# Patient Record
Sex: MALE | Race: WHITE | NOT HISPANIC OR LATINO
[De-identification: names, ages, dates, MRNs, and addresses within clinical notes are randomized per-mention and may not be internally consistent; named-entity substitution may affect disease eponyms.]

---

## 2017-03-21 ENCOUNTER — APPOINTMENT (OUTPATIENT)
Dept: CARDIOLOGY | Facility: CLINIC | Age: 77
End: 2017-03-21

## 2017-04-03 ENCOUNTER — APPOINTMENT (OUTPATIENT)
Dept: CARDIOLOGY | Facility: CLINIC | Age: 77
End: 2017-04-03

## 2017-04-07 ENCOUNTER — APPOINTMENT (OUTPATIENT)
Dept: CARDIOLOGY | Facility: CLINIC | Age: 77
End: 2017-04-07

## 2017-06-20 ENCOUNTER — APPOINTMENT (OUTPATIENT)
Dept: CARDIOLOGY | Facility: CLINIC | Age: 77
End: 2017-06-20

## 2017-06-28 ENCOUNTER — APPOINTMENT (OUTPATIENT)
Dept: CARDIOLOGY | Facility: CLINIC | Age: 77
End: 2017-06-28

## 2017-07-10 ENCOUNTER — APPOINTMENT (OUTPATIENT)
Dept: CARDIOLOGY | Facility: CLINIC | Age: 77
End: 2017-07-10

## 2017-08-03 ENCOUNTER — APPOINTMENT (OUTPATIENT)
Dept: CARDIOLOGY | Facility: CLINIC | Age: 77
End: 2017-08-03

## 2017-08-08 ENCOUNTER — APPOINTMENT (OUTPATIENT)
Dept: CARDIOLOGY | Facility: CLINIC | Age: 77
End: 2017-08-08
Payer: MEDICARE

## 2017-08-08 PROCEDURE — 99214 OFFICE O/P EST MOD 30 MIN: CPT

## 2017-09-08 ENCOUNTER — APPOINTMENT (OUTPATIENT)
Dept: CARDIOLOGY | Facility: CLINIC | Age: 77
End: 2017-09-08
Payer: MEDICARE

## 2017-09-08 PROCEDURE — 93288 INTERROG EVL PM/LDLS PM IP: CPT

## 2017-11-02 ENCOUNTER — RECORD ABSTRACTING (OUTPATIENT)
Age: 77
End: 2017-11-02

## 2017-11-02 DIAGNOSIS — Z86.79 PERSONAL HISTORY OF OTHER DISEASES OF THE CIRCULATORY SYSTEM: ICD-10-CM

## 2017-11-02 DIAGNOSIS — Z86.2 PERSONAL HISTORY OF DISEASES OF THE BLOOD AND BLOOD-FORMING ORGANS AND CERTAIN DISORDERS INVOLVING THE IMMUNE MECHANISM: ICD-10-CM

## 2017-11-02 DIAGNOSIS — Z86.19 PERSONAL HISTORY OF OTHER INFECTIOUS AND PARASITIC DISEASES: ICD-10-CM

## 2017-11-02 DIAGNOSIS — Z87.898 PERSONAL HISTORY OF OTHER SPECIFIED CONDITIONS: ICD-10-CM

## 2017-11-02 DIAGNOSIS — Z98.890 OTHER SPECIFIED POSTPROCEDURAL STATES: ICD-10-CM

## 2017-12-08 ENCOUNTER — APPOINTMENT (OUTPATIENT)
Dept: CARDIOLOGY | Facility: CLINIC | Age: 77
End: 2017-12-08
Payer: MEDICARE

## 2017-12-08 PROCEDURE — 93280 PM DEVICE PROGR EVAL DUAL: CPT

## 2017-12-12 ENCOUNTER — RX RENEWAL (OUTPATIENT)
Age: 77
End: 2017-12-12

## 2018-02-13 ENCOUNTER — APPOINTMENT (OUTPATIENT)
Dept: CARDIOLOGY | Facility: CLINIC | Age: 78
End: 2018-02-13
Payer: MEDICARE

## 2018-02-13 VITALS
SYSTOLIC BLOOD PRESSURE: 132 MMHG | WEIGHT: 179 LBS | HEIGHT: 68 IN | BODY MASS INDEX: 27.13 KG/M2 | DIASTOLIC BLOOD PRESSURE: 72 MMHG | HEART RATE: 62 BPM

## 2018-02-13 PROCEDURE — 99214 OFFICE O/P EST MOD 30 MIN: CPT

## 2018-02-13 RX ORDER — METOPROLOL TARTRATE 25 MG/1
25 TABLET, FILM COATED ORAL
Qty: 90 | Refills: 1 | Status: DISCONTINUED | COMMUNITY
End: 2018-02-13

## 2018-03-08 ENCOUNTER — APPOINTMENT (OUTPATIENT)
Dept: CARDIOLOGY | Facility: CLINIC | Age: 78
End: 2018-03-08
Payer: MEDICARE

## 2018-03-08 PROCEDURE — 93306 TTE W/DOPPLER COMPLETE: CPT

## 2018-03-08 PROCEDURE — 93280 PM DEVICE PROGR EVAL DUAL: CPT

## 2018-07-10 ENCOUNTER — APPOINTMENT (OUTPATIENT)
Dept: CARDIOLOGY | Facility: CLINIC | Age: 78
End: 2018-07-10

## 2018-08-01 ENCOUNTER — APPOINTMENT (OUTPATIENT)
Dept: CARDIOLOGY | Facility: CLINIC | Age: 78
End: 2018-08-01
Payer: MEDICARE

## 2018-08-01 VITALS
SYSTOLIC BLOOD PRESSURE: 122 MMHG | WEIGHT: 176 LBS | BODY MASS INDEX: 26.67 KG/M2 | HEART RATE: 68 BPM | HEIGHT: 68 IN | DIASTOLIC BLOOD PRESSURE: 70 MMHG

## 2018-08-01 PROCEDURE — 99214 OFFICE O/P EST MOD 30 MIN: CPT | Mod: 25

## 2018-08-01 PROCEDURE — 93280 PM DEVICE PROGR EVAL DUAL: CPT

## 2018-08-01 PROCEDURE — 99214 OFFICE O/P EST MOD 30 MIN: CPT

## 2018-11-01 ENCOUNTER — APPOINTMENT (OUTPATIENT)
Dept: CARDIOLOGY | Facility: CLINIC | Age: 78
End: 2018-11-01
Payer: MEDICARE

## 2018-11-01 PROCEDURE — 93280 PM DEVICE PROGR EVAL DUAL: CPT

## 2018-12-18 ENCOUNTER — NON-APPOINTMENT (OUTPATIENT)
Age: 78
End: 2018-12-18

## 2018-12-18 ENCOUNTER — APPOINTMENT (OUTPATIENT)
Dept: CARDIOLOGY | Facility: CLINIC | Age: 78
End: 2018-12-18
Payer: MEDICARE

## 2018-12-18 VITALS
HEIGHT: 68 IN | SYSTOLIC BLOOD PRESSURE: 120 MMHG | DIASTOLIC BLOOD PRESSURE: 68 MMHG | RESPIRATION RATE: 14 BRPM | HEART RATE: 72 BPM | BODY MASS INDEX: 26.67 KG/M2 | WEIGHT: 176 LBS | OXYGEN SATURATION: 96 %

## 2018-12-18 PROCEDURE — 99214 OFFICE O/P EST MOD 30 MIN: CPT

## 2018-12-18 NOTE — HISTORY OF PRESENT ILLNESS
[FreeTextEntry1] : Bill is a 77-year-old male with history of aortic stenosis, ascending aortic aneurysm, status post single-vessel CABG, bioprosthetic AVR, and ascending aortic repair at [facility] in April 2013, postop heart block, [employer] permanent pacemaker implant in April 2013 at [place], hypertension and dyslipidemia, Babisiosis, thrombocytopenia, multiple platelet transfusion, hematology Dr Agarwal, syncopal fall head trauma SDH admit [facility] August 2014.\par \par Cardiovascular review of symptoms is negative for exertional chest pain, dyspnea, palpitations, dizziness or syncope. No PND or orthopnea leg edema. No bleeding or black stool.\par \par Patient is walking 20 minutes routine basis without exertional symptoms. He is compliant with his diet medications.\par \par Pacemaker check 6/28/17, SJM dual chamber, adequate battery, A- pacing 34% V-pacing less than 1%, 3 ventricular heart rate him in SVT, Toprol increased to 12.5 mg twice per day repeat check in 3 months\par  \par Lexascan Myoview stress July 2017, LVEF 54% diaphragm attenuation, no ischemia nonischemic EKG response, baseline sinus rhythm PRWP, NSST, no angina\par \par 2-D echo April 2017, LVEF 55%, function, normal bioprosthetic AVR, mild/moderate MR, normal aortic valve gradient, mild TR, PASP 35\par \par Carotid and abdominal ultrasound April 2017, nonobstructive, proximal aorta 2.8 cm.\par  \par Pacemaker check 3/21/17, SJM dual lead PPM, battery voltage 2.95, adequate thresholds, a paced 23% the patient less than 1%, brief SVT, 3 brief AF episodes less than 10 seconds\par \par Pacemaker check 9/29/16 adequate battery and threshold, several brief A. fib episode 8/7/16 2 minutes 32 seconds A. fib, 7/9/16 lasting 6 seconds.\par \par Stress echo performed February 2013 revealing a normal LVEF 60%, hypokinesis of the inferior anterior septal wall, mean aortic gradient of 108 with exercise at 93% of maximum predicted heart rate, 8 minutes 52 seconds Galo protocol, ischemic EKG response. \par \par Post op echo was performed in June 2013, LVEF 55%, moderate-to-severe left atrial enlargement, mild diastolic dysfunction, trace mild MR, mild TR, normal aortic valve gradient with a normally functioning aortic valve bioprosthesis.\par

## 2018-12-18 NOTE — PHYSICAL EXAM
[General Appearance - Well Developed] : well developed [Normal Appearance] : normal appearance [Well Groomed] : well groomed [General Appearance - Well Nourished] : well nourished [No Deformities] : no deformities [General Appearance - In No Acute Distress] : no acute distress [Normal Conjunctiva] : the conjunctiva exhibited no abnormalities [Eyelids - No Xanthelasma] : the eyelids demonstrated no xanthelasmas [Normal Oral Mucosa] : normal oral mucosa [No Oral Pallor] : no oral pallor [No Oral Cyanosis] : no oral cyanosis [Normal Jugular Venous A Waves Present] : normal jugular venous A waves present [Normal Jugular Venous V Waves Present] : normal jugular venous V waves present [No Jugular Venous Paiz A Waves] : no jugular venous paiz A waves [Respiration, Rhythm And Depth] : normal respiratory rhythm and effort [Exaggerated Use Of Accessory Muscles For Inspiration] : no accessory muscle use [Auscultation Breath Sounds / Voice Sounds] : lungs were clear to auscultation bilaterally [Heart Rate And Rhythm] : heart rate and rhythm were normal [Heart Sounds] : normal S1 and S2 [Abdomen Soft] : soft [Abdomen Tenderness] : non-tender [Abdomen Mass (___ Cm)] : no abdominal mass palpated [Abnormal Walk] : normal gait [Gait - Sufficient For Exercise Testing] : the gait was sufficient for exercise testing [Nail Clubbing] : no clubbing of the fingernails [Cyanosis, Localized] : no localized cyanosis [Petechial Hemorrhages (___cm)] : no petechial hemorrhages [Skin Color & Pigmentation] : normal skin color and pigmentation [] : no rash [No Venous Stasis] : no venous stasis [Skin Lesions] : no skin lesions [No Skin Ulcers] : no skin ulcer [No Xanthoma] : no  xanthoma was observed [Oriented To Time, Place, And Person] : oriented to person, place, and time [Affect] : the affect was normal [Mood] : the mood was normal [No Anxiety] : not feeling anxious [FreeTextEntry1] : 2/6 KRYSTINA, nl AVR click, right upper chest PPM

## 2018-12-18 NOTE — REASON FOR VISIT
[Follow-Up - Clinic] : a clinic follow-up of [Aortic Stenosis] : aortic stenosis [Follow-up Device Check] : follow-up device check visit [FreeTextEntry2] : bioAVR asc aorta repair 2013, CHB, PPM 2013, syncope fall SDH CVA 2014 [FreeTextEntry1] : Bill is a 78-year-old male with history of aortic stenosis, ascending aortic aneurysm, status post single-vessel CABG, bioprosthetic AVR, and ascending aortic repair at [facility] in April 2013, postop heart block, [employer] Kirit permanent pacemaker implant in April 2013 at [place], hypertension and dyslipidemia, Babisiosis, thrombocytopenia, multiple platelet transfusion, hematology Dr Agarwal, syncopal fall head trauma SDH admit [facility] August 2014, Prostate CA radiation therapy\par \par Cardiovascular review of symptoms is negative for exertional chest pain, dyspnea, palpitations, dizziness or syncope. No PND or orthopnea leg edema. No bleeding or black stool.\par \par Patient is walking 20 minutes routine basis without exertional symptoms. He is compliant with his diet medications.\par \par Patient is walking 15 minutes without exertional chest pain.\par \par Pacemaker check 6/28/17, SJM dual chamber, adequate battery, A- pacing 34% V-pacing less than 1%, 3 ventricular heart rate him in SVT, Toprol increased to 12.5 mg twice per day repeat check in 3 months\par  \par Lexascan Myoview stress July 2017, LVEF 54% diaphragm attenuation, no ischemia nonischemic EKG response, baseline sinus rhythm PRWP, NSST, no angina\par \par 2-D echo April 2017, LVEF 55%, function, normal bioprosthetic AVR, mild/moderate MR, normal aortic valve gradient, mild TR, PASP 35\par \par Carotid and abdominal ultrasound April 2017, nonobstructive, proximal aorta 2.8 cm.\par  \par Pacemaker check 3/21/17, SJM dual lead PPM, battery voltage 2.95, adequate thresholds, a paced 23% the patient less than 1%, brief SVT, 3 brief AF episodes less than 10 seconds\par \par Pacemaker check 9/29/16 adequate battery and threshold, several brief A. fib episode 8/7/16 2 minutes 32 seconds A. fib, 7/9/16 lasting 6 seconds.\par \par Stress echo performed February 2013 revealing a normal LVEF 60%, hypokinesis of the inferior anterior septal wall, mean aortic gradient of 108 with exercise at 93% of maximum predicted heart rate, 8 minutes 52 seconds Galo protocol, ischemic EKG response. \par \par Post op echo was performed in June 2013, LVEF 55%, moderate-to-severe left atrial enlargement, mild diastolic dysfunction, trace mild MR, mild TR, normal aortic valve gradient with a normally functioning aortic valve bioprosthesis.\par

## 2018-12-18 NOTE — DISCUSSION/SUMMARY
[FreeTextEntry1] : Bill is a 77-year-old male with medical history detailed above and active medical issues including: \par \par -No angina symptoms and normal perfusion MPI stress test, normal LVEF July 2017\par \par -History of severe aortic stenosis, abnormal exercise stress echo, status post single-vessel CABG, bioprosthetic AVR, aortic root repair in April 2013.\par \par - Brief PAF CHADS VASc score 3, history of subdural hematoma, moderate risk for oral anticoagulation,  electrophysiologist Dr. Saad Reyes, no AC unless prolonged PAF.\par \par -Postop heart block, St. Kirit dual chamber permanent pacemaker implant in April 2013.\par \par -Hypertension at goal <130/80, follow up home BP daily over one week.\par \par -History of Babesiosis/thrombocytopenia following with hematology \par \par -Dyslipidemia intolerant to statins .\par \par -Prior tobacco abuse.\par \par Advised patient to follow active lifestyle with regular cardiovascular exercise. Patient educated on lifestyle and diet modification with low sodium low fat diet and avoidance of excessive alcohol. Patient is aware to call with any symptoms or concerns. \par \par Patient was seen in cardiology followup in 6 months. Continued office PPM check every 6 months with home TTM every 3 months. Patient will have 2-D echocardiogram to assess for  LV systolic function, wall motion and  structural heart disease. Carotid and abdominal ultrasound to assess for obstructive PAD. \par \par Bill will follow up with Dr. Bennett for ongoing medical management and repeat labs.\par

## 2018-12-18 NOTE — PROCEDURE
[No] : not [NSR] : normal sinus rhythm [Pacemaker] : pacemaker [DDD] : DDD [Voltage: ___ volts] : Voltage was [unfilled] volts [Lead Imp:  ___ohms] : lead impedance was [unfilled] ohms [Sensing Amplitude ___mv] : sensing amplitude was [unfilled] mv [___V @] : [unfilled] V [___ ms] : [unfilled] ms [None] : none [Threshold Testing Performed] : Threshold testing was not performed [de-identified] : RAZA  [de-identified] : [employer]  [de-identified] : [id number] [de-identified] : [birth year] [de-identified] : 62 [de-identified] : 8.2-9.3 years.  [de-identified] : AP 30%\par  <1 %\par 1 Episodes AF < 10s and one episode of Aflutter vs SVT <10 s\par \par He has history of this, however given prior history of syncopal fall and SDH, EP advised no AC unless prolonged AF. \par \par Continue 3 months follow up. \par

## 2019-03-07 ENCOUNTER — APPOINTMENT (OUTPATIENT)
Dept: CARDIOLOGY | Facility: CLINIC | Age: 79
End: 2019-03-07
Payer: MEDICARE

## 2019-03-07 PROCEDURE — 93280 PM DEVICE PROGR EVAL DUAL: CPT

## 2019-03-08 NOTE — PROCEDURE
[No] : not [NSR] : normal sinus rhythm [Pacemaker] : pacemaker [DDD] : DDD [Voltage: ___ volts] : Voltage was [unfilled] volts [Lead Imp:  ___ohms] : lead impedance was [unfilled] ohms [Sensing Amplitude ___mv] : sensing amplitude was [unfilled] mv [___V @] : [unfilled] V [___ ms] : [unfilled] ms [None] : none [Threshold Testing Performed] : Threshold testing was not performed [de-identified] : RAZA  [de-identified] : [employer]  [de-identified] : [id number] [de-identified] : [birth year] [de-identified] : 62 [de-identified] : 8.2-9.3 years.  [de-identified] : AP 29%\par  <1 %\par 1 Episodes AT < 10s  no definitive AF. \par \par He has history of this, however given prior history of syncopal fall and SDH, EP advised no AC unless prolonged AF. \par \par Continue 3 months follow up. \par

## 2019-03-08 NOTE — REASON FOR VISIT
[Follow-up Device Check] : follow-up device check visit [___ Device Check] : [unfilled] device check [Follow-Up - Clinic] : a clinic follow-up of [Aortic Stenosis] : aortic stenosis [FreeTextEntry2] : bioAVR asc aorta repair 2013, CHB, PPM 2013, syncope fall SDH CVA 2014

## 2019-06-03 ENCOUNTER — CHART COPY (OUTPATIENT)
Age: 79
End: 2019-06-03

## 2019-06-04 ENCOUNTER — APPOINTMENT (OUTPATIENT)
Dept: CARDIOLOGY | Facility: CLINIC | Age: 79
End: 2019-06-04
Payer: MEDICARE

## 2019-06-04 PROCEDURE — 93306 TTE W/DOPPLER COMPLETE: CPT

## 2019-06-11 ENCOUNTER — APPOINTMENT (OUTPATIENT)
Dept: CARDIOLOGY | Facility: CLINIC | Age: 79
End: 2019-06-11
Payer: MEDICARE

## 2019-06-11 VITALS
SYSTOLIC BLOOD PRESSURE: 112 MMHG | WEIGHT: 174 LBS | OXYGEN SATURATION: 98 % | BODY MASS INDEX: 26.37 KG/M2 | HEIGHT: 68 IN | HEART RATE: 68 BPM | DIASTOLIC BLOOD PRESSURE: 76 MMHG

## 2019-06-11 PROCEDURE — 99215 OFFICE O/P EST HI 40 MIN: CPT

## 2019-06-11 RX ORDER — METOPROLOL TARTRATE 25 MG/1
25 TABLET, FILM COATED ORAL
Qty: 90 | Refills: 1 | Status: DISCONTINUED | COMMUNITY
Start: 2018-08-01 | End: 2019-06-11

## 2019-06-11 NOTE — REASON FOR VISIT
[Follow-Up - Clinic] : a clinic follow-up of [Aortic Stenosis] : aortic stenosis [Follow-up Device Check] : follow-up device check visit [FreeTextEntry2] : bioAVR asc aorta repair 2013, CHB, PPM 2013, syncope fall SDH CVA 2014 [FreeTextEntry1] : I saw this  78-year-old male in f/u consultation on 06/11/19.\par  history of aortic stenosis, ascending aortic aneurysm, status post single-vessel CABG, bioprosthetic AVR, and ascending aortic repair at [facility] in April 2013, postop heart block, [employer] Kirit permanent pacemaker implant in April 2013 at [place], hypertension and dyslipidemia, Babisiosis, thrombocytopenia, multiple platelet transfusion, hematology Dr Agarwal, syncopal fall head trauma SDH admit [facility] August 2014, Prostate CA radiation therapy\par \par Cardiovascular review of symptoms is negative for exertional chest pain, dyspnea, palpitations, dizziness or syncope. No PND or orthopnea leg edema. No bleeding or black stool.\par \par Patient is walking 20 minutes routine basis without exertional symptoms. He is compliant with his diet medications.\par \par Patient is walking 15 minutes without exertional chest pain.\par \par Pacemaker check 6/28/17, SJM dual chamber, adequate battery, A- pacing 34% V-pacing less than 1%, 3 ventricular heart rate him in SVT, Toprol increased to 12.5 mg twice per day repeat check in 3 months\par  \par Lexascan Myoview stress July 2017, LVEF 54% diaphragm attenuation, no ischemia nonischemic EKG response, baseline sinus rhythm PRWP, NSST, no angina\par \par 2-D echo April 2017, LVEF 55%, function, normal bioprosthetic AVR, mild/moderate MR, normal aortic valve gradient, mild TR, PASP 35\par \par Carotid and abdominal ultrasound April 2017, nonobstructive, proximal aorta 2.8 cm.\par  \par Pacemaker check 3/21/17, SJM dual lead PPM, battery voltage 2.95, adequate thresholds, a paced 23% the patient less than 1%, brief SVT, 3 brief AF episodes less than 10 seconds\par \par Pacemaker check 9/29/16 adequate battery and threshold, several brief A. fib episode 8/7/16 2 minutes 32 seconds A. fib, 7/9/16 lasting 6 seconds.\par \par Stress echo performed February 2013 revealing a normal LVEF 60%, hypokinesis of the inferior anterior septal wall, mean aortic gradient of 108 with exercise at 93% of maximum predicted heart rate, 8 minutes 52 seconds Galo protocol, ischemic EKG response. \par \par Post op echo was performed in June 2013, LVEF 55%, moderate-to-severe left atrial enlargement, mild diastolic dysfunction, trace mild MR, mild TR, normal aortic valve gradient with a normally functioning aortic valve bioprosthesis.\par

## 2019-06-11 NOTE — HISTORY OF PRESENT ILLNESS
[FreeTextEntry1] : he has no chest pain\par He has no shortness of breath\par He has no palpitations\par He has no syncope\par He is neurologically intact\par He has no edema\par He has no GI symptoms\par \par

## 2019-06-11 NOTE — PROCEDURE
[No] : not [DDD] : DDD [NSR] : normal sinus rhythm [Pacemaker] : pacemaker [Voltage: ___ volts] : Voltage was [unfilled] volts [Lead Imp:  ___ohms] : lead impedance was [unfilled] ohms [___V @] : [unfilled] V [Sensing Amplitude ___mv] : sensing amplitude was [unfilled] mv [None] : none [___ ms] : [unfilled] ms [de-identified] : RAZA  [Threshold Testing Performed] : Threshold testing was not performed [de-identified] : [id number] [de-identified] : [employer]  [de-identified] : [birth year] [de-identified] : 62 [de-identified] : 8.2-9.3 years.  [de-identified] : AP 30%\par  <1 %\par 1 Episodes AF < 10s and one episode of Aflutter vs SVT <10 s\par \par He has history of this, however given prior history of syncopal fall and SDH, EP advised no AC unless prolonged AF. \par \par Continue 3 months follow up. \par

## 2019-06-11 NOTE — PHYSICAL EXAM
[General Appearance - Well Developed] : well developed [Normal Appearance] : normal appearance [General Appearance - Well Nourished] : well nourished [Well Groomed] : well groomed [No Deformities] : no deformities [General Appearance - In No Acute Distress] : no acute distress [Normal Conjunctiva] : the conjunctiva exhibited no abnormalities [Normal Oral Mucosa] : normal oral mucosa [Eyelids - No Xanthelasma] : the eyelids demonstrated no xanthelasmas [No Oral Pallor] : no oral pallor [No Oral Cyanosis] : no oral cyanosis [Normal Jugular Venous A Waves Present] : normal jugular venous A waves present [Normal Jugular Venous V Waves Present] : normal jugular venous V waves present [No Jugular Venous Paiz A Waves] : no jugular venous paiz A waves [Respiration, Rhythm And Depth] : normal respiratory rhythm and effort [Exaggerated Use Of Accessory Muscles For Inspiration] : no accessory muscle use [Heart Rate And Rhythm] : heart rate and rhythm were normal [Heart Sounds] : normal S1 and S2 [Auscultation Breath Sounds / Voice Sounds] : lungs were clear to auscultation bilaterally [Abdomen Soft] : soft [Abdomen Mass (___ Cm)] : no abdominal mass palpated [Abdomen Tenderness] : non-tender [Nail Clubbing] : no clubbing of the fingernails [Gait - Sufficient For Exercise Testing] : the gait was sufficient for exercise testing [Abnormal Walk] : normal gait [Cyanosis, Localized] : no localized cyanosis [Petechial Hemorrhages (___cm)] : no petechial hemorrhages [Skin Color & Pigmentation] : normal skin color and pigmentation [] : no rash [No Venous Stasis] : no venous stasis [Skin Lesions] : no skin lesions [No Skin Ulcers] : no skin ulcer [No Xanthoma] : no  xanthoma was observed [Oriented To Time, Place, And Person] : oriented to person, place, and time [Affect] : the affect was normal [Mood] : the mood was normal [No Anxiety] : not feeling anxious [FreeTextEntry1] : 2/6 KRYSTINA, nl AVR click, right upper chest PPM

## 2019-06-18 ENCOUNTER — APPOINTMENT (OUTPATIENT)
Dept: CARDIOLOGY | Facility: CLINIC | Age: 79
End: 2019-06-18
Payer: MEDICARE

## 2019-06-18 PROCEDURE — 93280 PM DEVICE PROGR EVAL DUAL: CPT

## 2019-06-18 NOTE — PROCEDURE
[No] : not [NSR] : normal sinus rhythm [Pacemaker] : pacemaker [DDD] : DDD [Voltage: ___ volts] : Voltage was [unfilled] volts [___V @] : [unfilled] V [___ ms] : [unfilled] ms [None] : none [Threshold Testing Performed] : Threshold testing was not performed [Sensing Amplitude ___mv] : sensing amplitude was [unfilled] mv [Lead Imp:  ___ohms] : lead impedance was [unfilled] ohms [de-identified] : RAZA  [de-identified] : [employer]  [de-identified] : [id number] [de-identified] : [birth year] [de-identified] : 7.5-9.1 years.  [de-identified] : 62 [de-identified] : AP 29%\par  <1 %\par 1 Episodes SVT < 10s  no  AF. On beta blockers, Asx, in April 2019, none since. Normal EF by echo 6-19.\par \par \par \par Continue 3 months follow up. \par

## 2019-06-25 ENCOUNTER — APPOINTMENT (OUTPATIENT)
Dept: CARDIOLOGY | Facility: CLINIC | Age: 79
End: 2019-06-25
Payer: MEDICARE

## 2019-07-01 ENCOUNTER — APPOINTMENT (OUTPATIENT)
Dept: CARDIOLOGY | Facility: CLINIC | Age: 79
End: 2019-07-01
Payer: MEDICARE

## 2019-07-01 PROCEDURE — 93880 EXTRACRANIAL BILAT STUDY: CPT

## 2019-07-01 PROCEDURE — 93979 VASCULAR STUDY: CPT

## 2019-07-01 PROCEDURE — 93306 TTE W/DOPPLER COMPLETE: CPT

## 2019-08-05 ENCOUNTER — MEDICATION RENEWAL (OUTPATIENT)
Age: 79
End: 2019-08-05

## 2019-09-18 ENCOUNTER — APPOINTMENT (OUTPATIENT)
Dept: CARDIOLOGY | Facility: CLINIC | Age: 79
End: 2019-09-18
Payer: MEDICARE

## 2019-09-18 PROCEDURE — 93280 PM DEVICE PROGR EVAL DUAL: CPT

## 2019-09-18 NOTE — REASON FOR VISIT
[___ Device Check] : [unfilled] device check [Follow-up Device Check] : follow-up device check visit [Follow-Up - Clinic] : a clinic follow-up of [Aortic Stenosis] : aortic stenosis [FreeTextEntry2] : bioAVR asc aorta repair 2013, CHB, PPM 2013, syncope fall SDH CVA 2014

## 2019-09-18 NOTE — PROCEDURE
[No] : not [Pacemaker] : pacemaker [NSR] : normal sinus rhythm [DDD] : DDD [Voltage: ___ volts] : Voltage was [unfilled] volts [Sensing Amplitude ___mv] : sensing amplitude was [unfilled] mv [Lead Imp:  ___ohms] : lead impedance was [unfilled] ohms [___V @] : [unfilled] V [None] : none [___ ms] : [unfilled] ms [Threshold Testing Performed] : Threshold testing was not performed [de-identified] : RAZA  [de-identified] : [employer]  [de-identified] : [id number] [de-identified] : 62 [de-identified] : [birth year] [de-identified] : 7.0-8.2 years.  [de-identified] : AP 30%\par  <1 %\par \par 0 Episodes.\par \par Continue 3 months follow up. \par

## 2019-12-10 ENCOUNTER — NON-APPOINTMENT (OUTPATIENT)
Age: 79
End: 2019-12-10

## 2019-12-10 ENCOUNTER — APPOINTMENT (OUTPATIENT)
Dept: CARDIOLOGY | Facility: CLINIC | Age: 79
End: 2019-12-10
Payer: MEDICARE

## 2019-12-10 VITALS
SYSTOLIC BLOOD PRESSURE: 112 MMHG | HEIGHT: 68 IN | OXYGEN SATURATION: 97 % | WEIGHT: 170 LBS | DIASTOLIC BLOOD PRESSURE: 70 MMHG | BODY MASS INDEX: 25.76 KG/M2 | HEART RATE: 72 BPM

## 2019-12-10 PROCEDURE — 99215 OFFICE O/P EST HI 40 MIN: CPT

## 2019-12-10 PROCEDURE — 93000 ELECTROCARDIOGRAM COMPLETE: CPT

## 2019-12-10 NOTE — PHYSICAL EXAM
[Normal Appearance] : normal appearance [General Appearance - Well Developed] : well developed [Well Groomed] : well groomed [General Appearance - Well Nourished] : well nourished [No Deformities] : no deformities [General Appearance - In No Acute Distress] : no acute distress [Normal Conjunctiva] : the conjunctiva exhibited no abnormalities [Eyelids - No Xanthelasma] : the eyelids demonstrated no xanthelasmas [No Oral Pallor] : no oral pallor [No Oral Cyanosis] : no oral cyanosis [Normal Oral Mucosa] : normal oral mucosa [Normal Jugular Venous A Waves Present] : normal jugular venous A waves present [Normal Jugular Venous V Waves Present] : normal jugular venous V waves present [No Jugular Venous Paiz A Waves] : no jugular venous paiz A waves [Respiration, Rhythm And Depth] : normal respiratory rhythm and effort [Exaggerated Use Of Accessory Muscles For Inspiration] : no accessory muscle use [Auscultation Breath Sounds / Voice Sounds] : lungs were clear to auscultation bilaterally [Heart Rate And Rhythm] : heart rate and rhythm were normal [Heart Sounds] : normal S1 and S2 [Systolic grade ___/6] : A grade [unfilled]/6 systolic murmur was heard. [Abdomen Soft] : soft [Abdomen Tenderness] : non-tender [Abdomen Mass (___ Cm)] : no abdominal mass palpated [Abnormal Walk] : normal gait [Gait - Sufficient For Exercise Testing] : the gait was sufficient for exercise testing [Nail Clubbing] : no clubbing of the fingernails [Cyanosis, Localized] : no localized cyanosis [Petechial Hemorrhages (___cm)] : no petechial hemorrhages [Skin Color & Pigmentation] : normal skin color and pigmentation [] : no rash [No Venous Stasis] : no venous stasis [Skin Lesions] : no skin lesions [No Skin Ulcers] : no skin ulcer [No Xanthoma] : no  xanthoma was observed [Oriented To Time, Place, And Person] : oriented to person, place, and time [Affect] : the affect was normal [Mood] : the mood was normal [No Anxiety] : not feeling anxious [FreeTextEntry1] : 2/6 KRYSTINA, nl AVR click, right upper chest PPM

## 2019-12-10 NOTE — PROCEDURE
[No] : not [Pacemaker] : pacemaker [NSR] : normal sinus rhythm [Voltage: ___ volts] : Voltage was [unfilled] volts [DDD] : DDD [Lead Imp:  ___ohms] : lead impedance was [unfilled] ohms [Sensing Amplitude ___mv] : sensing amplitude was [unfilled] mv [___V @] : [unfilled] V [___ ms] : [unfilled] ms [None] : none [Threshold Testing Performed] : Threshold testing was not performed [de-identified] : RAZA  [de-identified] : [employer]  [de-identified] : [birth year] [de-identified] : [id number] [de-identified] : 62 [de-identified] : 8.2-9.3 years.  [de-identified] : AP 30%\par  <1 %\par 1 Episodes AF < 10s and one episode of Aflutter vs SVT <10 s\par \par He has history of this, however given prior history of syncopal fall and SDH, EP advised no AC unless prolonged AF. \par \par Continue 3 months follow up. \par

## 2019-12-10 NOTE — DISCUSSION/SUMMARY
[FreeTextEntry1] : Bill is a 79-year-old male with medical history detailed above and active medical issues including: \par \par -No angina symptoms and normal perfusion MPI stress test, normal LVEF July 2017\par \par -History of severe aortic stenosis, abnormal exercise stress echo, status post single-vessel CABG, bioprosthetic AVR, aortic root repair in April 2013.\par \par - Brief PAF CHADS VASc score 3, history of subdural hematoma, moderate risk for oral anticoagulation,  electrophysiologist Dr. Saad Reyes, no AC unless prolonged PAF.\par \par -Postop heart block, [employer] dual chamber permanent pacemaker implant in April 2013.\par \par -Hypertension at goal <130/80, follow up home BP daily over one week.\par \par -History of Babesiosis/thrombocytopenia following with hematology \par \par -Dyslipidemia intolerant to statins .\par \par -Prior tobacco abuse.\par \par Advised patient to follow active lifestyle with regular cardiovascular exercise. Patient educated on lifestyle and diet modification with low sodium low fat diet and avoidance of excessive alcohol. Patient is aware to call with any symptoms or concerns. \par \par Patient was seen in cardiology followup in 6 months. Continued office PPM check every 6 months with home TTM every 3 months. Patient will have 2-D echocardiogram to assess for  LV systolic function, wall motion and  structural heart disease. Carotid and abdominal ultrasound to assess for obstructive PAD. \par \par Bill will follow up with Dr. Bennett for ongoing medical management and repeat labs.\par

## 2019-12-10 NOTE — REASON FOR VISIT
[Follow-Up - Clinic] : a clinic follow-up of [Aortic Stenosis] : aortic stenosis [Follow-up Device Check] : follow-up device check visit [FreeTextEntry2] : bioAVR asc aorta repair 2013, CHB, PPM 2013, syncope fall SDH CVA 2014 [FreeTextEntry1] : I saw this  79-year-old male in f/u consultation on 12/10/19.\par  history of aortic stenosis, ascending aortic aneurysm, status post single-vessel CABG, bioprosthetic AVR, and ascending aortic repair at [facility] in April 2013, postop heart block, [employer] permanent pacemaker implant in April 2013 at [place], hypertension and dyslipidemia, Babisiosis, thrombocytopenia, multiple platelet transfusion, hematology Dr Agarwal, syncopal fall head trauma SDH admit [facility] August 2014, Prostate CA radiation therapy\par \par Cardiovascular review of symptoms is negative for exertional chest pain, dyspnea, palpitations, dizziness or syncope. No PND or orthopnea leg edema. No bleeding or black stool.\par \par Patient is walking 20 minutes routine basis without exertional symptoms. He is compliant with his diet medications.\par \par Patient is walking 15 minutes without exertional chest pain.\par \par Pacemaker check 6/28/17, SJM dual chamber, adequate battery, A- pacing 34% V-pacing less than 1%, 3 ventricular heart rate him in SVT, Toprol increased to 12.5 mg twice per day repeat check in 3 months\par  \par Lexascan Myoview stress July 2017, LVEF 54% diaphragm attenuation, no ischemia nonischemic EKG response, baseline sinus rhythm PRWP, NSST, no angina\par \par 2-D echo April 2017, LVEF 55%, function, normal bioprosthetic AVR, mild/moderate MR, normal aortic valve gradient, mild TR, PASP 35\par \par Carotid and abdominal ultrasound April 2017, nonobstructive, proximal aorta 2.8 cm.\par  \par Pacemaker check 3/21/17, SJM dual lead PPM, battery voltage 2.95, adequate thresholds, a paced 23% the patient less than 1%, brief SVT, 3 brief AF episodes less than 10 seconds\par \par Pacemaker check 9/29/16 adequate battery and threshold, several brief A. fib episode 8/7/16 2 minutes 32 seconds A. fib, 7/9/16 lasting 6 seconds.\par \par Stress echo performed February 2013 revealing a normal LVEF 60%, hypokinesis of the inferior anterior septal wall, mean aortic gradient of 108 with exercise at 93% of maximum predicted heart rate, 8 minutes 52 seconds Galo protocol, ischemic EKG response. \par \par Post op echo was performed in June 2013, LVEF 55%, moderate-to-severe left atrial enlargement, mild diastolic dysfunction, trace mild MR, mild TR, normal aortic valve gradient with a normally functioning aortic valve bioprosthesis.\par

## 2019-12-17 ENCOUNTER — APPOINTMENT (OUTPATIENT)
Dept: CARDIOLOGY | Facility: CLINIC | Age: 79
End: 2019-12-17
Payer: MEDICARE

## 2019-12-17 PROCEDURE — 93280 PM DEVICE PROGR EVAL DUAL: CPT

## 2019-12-17 NOTE — PROCEDURE
[No] : not [Pacemaker] : pacemaker [NSR] : normal sinus rhythm [DDD] : DDD [Voltage: ___ volts] : Voltage was [unfilled] volts [Lead Imp:  ___ohms] : lead impedance was [unfilled] ohms [Sensing Amplitude ___mv] : sensing amplitude was [unfilled] mv [___V @] : [unfilled] V [___ ms] : [unfilled] ms [None] : none [de-identified] : [employer]  [Threshold Testing Performed] : Threshold testing was not performed [de-identified] : RAZA  [de-identified] : 4-24-[birth year] [de-identified] : 62 [de-identified] : [id number] [de-identified] : 7.0-8.2 years.  [de-identified] : AP 27%\par  <1 %\par \par 0 Episodes.\par \par Continue 3 months follow up.

## 2020-03-04 ENCOUNTER — APPOINTMENT (OUTPATIENT)
Dept: CARDIOLOGY | Facility: CLINIC | Age: 80
End: 2020-03-04

## 2020-03-10 ENCOUNTER — APPOINTMENT (OUTPATIENT)
Dept: CARDIOLOGY | Facility: CLINIC | Age: 80
End: 2020-03-10
Payer: MEDICARE

## 2020-03-10 VITALS
OXYGEN SATURATION: 97 % | DIASTOLIC BLOOD PRESSURE: 80 MMHG | WEIGHT: 177 LBS | HEART RATE: 60 BPM | HEIGHT: 68 IN | BODY MASS INDEX: 26.83 KG/M2 | SYSTOLIC BLOOD PRESSURE: 140 MMHG

## 2020-03-10 PROCEDURE — 93280 PM DEVICE PROGR EVAL DUAL: CPT

## 2020-03-10 PROCEDURE — 99215 OFFICE O/P EST HI 40 MIN: CPT

## 2020-03-10 NOTE — PROCEDURE
[No] : not [NSR] : normal sinus rhythm [Pacemaker] : pacemaker [DDD] : DDD [Voltage: ___ volts] : Voltage was [unfilled] volts [Lead Imp:  ___ohms] : lead impedance was [unfilled] ohms [Sensing Amplitude ___mv] : sensing amplitude was [unfilled] mv [___V @] : [unfilled] V [___ ms] : [unfilled] ms [None] : none [Threshold Testing Performed] : Threshold testing was not performed [de-identified] : RAZA  [de-identified] : [employer]  [de-identified] : [id number] [de-identified] : 4-24-[birth year] [de-identified] : 62 [de-identified] : 8.2-9.3 years.  [de-identified] : AP 30%\par  <1 %\par 1 Episodes AF < 10s and one episode of Aflutter vs SVT <10 s\par \par He has history of this, however given prior history of syncopal fall and SDH, EP advised no AC unless prolonged AF. \par \par Continue 3 months follow up. \par

## 2020-03-10 NOTE — PHYSICAL EXAM
[General Appearance - Well Developed] : well developed [Normal Appearance] : normal appearance [Well Groomed] : well groomed [General Appearance - Well Nourished] : well nourished [No Deformities] : no deformities [General Appearance - In No Acute Distress] : no acute distress [Normal Conjunctiva] : the conjunctiva exhibited no abnormalities [Eyelids - No Xanthelasma] : the eyelids demonstrated no xanthelasmas [Normal Oral Mucosa] : normal oral mucosa [No Oral Pallor] : no oral pallor [No Oral Cyanosis] : no oral cyanosis [Normal Jugular Venous A Waves Present] : normal jugular venous A waves present [Normal Jugular Venous V Waves Present] : normal jugular venous V waves present [No Jugular Venous Paiz A Waves] : no jugular venous paiz A waves [Respiration, Rhythm And Depth] : normal respiratory rhythm and effort [Exaggerated Use Of Accessory Muscles For Inspiration] : no accessory muscle use [Auscultation Breath Sounds / Voice Sounds] : lungs were clear to auscultation bilaterally [Heart Rate And Rhythm] : heart rate and rhythm were normal [Heart Sounds] : normal S1 and S2 [Systolic grade ___/6] : A grade [unfilled]/6 systolic murmur was heard. [Abdomen Soft] : soft [Abdomen Tenderness] : non-tender [Abdomen Mass (___ Cm)] : no abdominal mass palpated [Abnormal Walk] : normal gait [Gait - Sufficient For Exercise Testing] : the gait was sufficient for exercise testing [Nail Clubbing] : no clubbing of the fingernails [Cyanosis, Localized] : no localized cyanosis [Petechial Hemorrhages (___cm)] : no petechial hemorrhages [Skin Color & Pigmentation] : normal skin color and pigmentation [] : no rash [No Venous Stasis] : no venous stasis [Skin Lesions] : no skin lesions [No Skin Ulcers] : no skin ulcer [No Xanthoma] : no  xanthoma was observed [Oriented To Time, Place, And Person] : oriented to person, place, and time [Affect] : the affect was normal [Mood] : the mood was normal [No Anxiety] : not feeling anxious [FreeTextEntry1] : 2/6 KRYSTINA, nl AVR click, right upper chest PPM

## 2020-03-10 NOTE — REASON FOR VISIT
[Follow-Up - Clinic] : a clinic follow-up of [Aortic Stenosis] : aortic stenosis [Follow-up Device Check] : follow-up device check visit [FreeTextEntry2] : bioAVR asc aorta repair 2013, CHB, PPM 2013, syncope fall SDH CVA 2014 [FreeTextEntry1] : I saw this  79-year-old male in f/u consultation on 03/10/20.\par  history of aortic stenosis, ascending aortic aneurysm, status post single-vessel CABG, bioprosthetic AVR, and ascending aortic repair at [facility] in April 2013, postop heart block, [employer] permanent pacemaker implant in April 2013 at [place], hypertension and dyslipidemia, Babisiosis, thrombocytopenia, multiple platelet transfusion, hematology Dr Agarwal, syncopal fall head trauma SDH admit [facility] August 2014, Prostate CA radiation therapy\par \par Cardiovascular review of symptoms is negative for exertional chest pain, dyspnea, palpitations, dizziness or syncope. No PND or orthopnea leg edema. No bleeding or black stool.\par \par Patient is walking 20 minutes routine basis without exertional symptoms. He is compliant with his diet medications.\par \par Patient is walking 15 minutes without exertional chest pain.\par \par Pacemaker check 6/28/17, SJM dual chamber, adequate battery, A- pacing 34% V-pacing less than 1%, 3 ventricular heart rate him in SVT, Toprol increased to 12.5 mg twice per day repeat check in 3 months\par  \par Lexascan Myoview stress July 2017, LVEF 54% diaphragm attenuation, no ischemia nonischemic EKG response, baseline sinus rhythm PRWP, NSST, no angina\par \par 2-D echo April 2017, LVEF 55%, function, normal bioprosthetic AVR, mild/moderate MR, normal aortic valve gradient, mild TR, PASP 35\par \par Carotid and abdominal ultrasound April 2017, nonobstructive, proximal aorta 2.8 cm.\par  \par Pacemaker check 3/21/17, SJM dual lead PPM, battery voltage 2.95, adequate thresholds, a paced 23% the patient less than 1%, brief SVT, 3 brief AF episodes less than 10 seconds\par \par Pacemaker check 9/29/16 adequate battery and threshold, several brief A. fib episode 8/7/16 2 minutes 32 seconds A. fib, 7/9/16 lasting 6 seconds.\par \par Stress echo performed February 2013 revealing a normal LVEF 60%, hypokinesis of the inferior anterior septal wall, mean aortic gradient of 108 with exercise at 93% of maximum predicted heart rate, 8 minutes 52 seconds Galo protocol, ischemic EKG response. \par \par Post op echo was performed in June 2013, LVEF 55%, moderate-to-severe left atrial enlargement, mild diastolic dysfunction, trace mild MR, mild TR, normal aortic valve gradient with a normally functioning aortic valve bioprosthesis.\par

## 2020-03-10 NOTE — DISCUSSION/SUMMARY
[FreeTextEntry1] : Bill is a 79-year-old male with medical history detailed above and active medical issues including: \par \par -No angina symptoms and normal perfusion MPI stress test, normal LVEF July 2017\par \par -History of severe aortic stenosis, abnormal exercise stress echo, status post single-vessel CABG, bioprosthetic AVR, aortic root repair in April 2013.\par \par - Brief PAF CHADS VASc score 3, history of subdural hematoma, moderate risk for oral anticoagulation,  electrophysiologist Dr. Saad Reyes, no AC unless prolonged PAF.\par \par -Postop heart block, [employer]irit dual chamber permanent pacemaker implant in April 2013.\par \par -Hypertension at goal <130/80, follow up home BP daily over one week.\par \par -History of Babesiosis/thrombocytopenia following with hematology \par \par -Dyslipidemia intolerant to statins .\par \par -Prior tobacco abuse.\par \par Advised patient to follow active lifestyle with regular cardiovascular exercise. Patient educated on lifestyle and diet modification with low sodium low fat diet and avoidance of excessive alcohol. Patient is aware to call with any symptoms or concerns. \par \par Patient was seen in cardiology followup in 6 months. Continued office PPM check every 6 months with home TTM every 3 months. Patient will have 2-D echocardiogram to assess for  LV systolic function, wall motion and  structural heart disease. Carotid and abdominal ultrasound to assess for obstructive PAD. \par \par Bill will follow up with Dr. Bennett for ongoing medical management and repeat labs.\par

## 2020-03-10 NOTE — PROCEDURE
[No] : not [NSR] : normal sinus rhythm [Pacemaker] : pacemaker [DDD] : DDD [Voltage: ___ volts] : Voltage was [unfilled] volts [Lead Imp:  ___ohms] : lead impedance was [unfilled] ohms [Sensing Amplitude ___mv] : sensing amplitude was [unfilled] mv [___V @] : [unfilled] V [___ ms] : [unfilled] ms [None] : none [Threshold Testing Performed] : Threshold testing was not performed [de-identified] : [employer] [de-identified] : [employer]  [de-identified] : [id number] [de-identified] : 4-[birth year] [de-identified] : 62 [de-identified] : 7.0-8.3 years.  [de-identified] : AP 29%\par  <1 %\par \par 2 episodes of SVT and 3 episodes of A. Flutter.  Asymptomatic.  Brief.  Longest 14 seconds.  No FND.  No AC with hx of falls.  Continue to monitor.  Seeing Dr. Ding today. \par \par Continue 3 months follow up.

## 2020-06-09 ENCOUNTER — APPOINTMENT (OUTPATIENT)
Dept: CARDIOLOGY | Facility: CLINIC | Age: 80
End: 2020-06-09
Payer: MEDICARE

## 2020-06-09 PROCEDURE — 93280 PM DEVICE PROGR EVAL DUAL: CPT

## 2020-06-11 NOTE — PROCEDURE
[No] : not [NSR] : normal sinus rhythm [Pacemaker] : pacemaker [DDD] : DDD [Voltage: ___ volts] : Voltage was [unfilled] volts [Lead Imp:  ___ohms] : lead impedance was [unfilled] ohms [Sensing Amplitude ___mv] : sensing amplitude was [unfilled] mv [___V @] : [unfilled] V [___ ms] : [unfilled] ms [None] : none [Threshold Testing Performed] : Threshold testing was not performed [de-identified] : [employer]  [de-identified] : [id number] [de-identified] : [employer] [de-identified] : 7.0-8.2 years.  [de-identified] : 62 [de-identified] : 4[birth year]24[birth year] [de-identified] : AP 36%\par  <1 %\par \par 0 Episodes.\par \par Continue 3 month follow up.

## 2020-06-11 NOTE — REASON FOR VISIT
[Follow-up Device Check] : follow-up device check visit [Follow-Up - Clinic] : a clinic follow-up of [Aortic Stenosis] : aortic stenosis [FreeTextEntry2] : bioAVR asc aorta repair 2013, CHB, PPM 2013, syncope fall SDH CVA 2014

## 2020-09-16 ENCOUNTER — APPOINTMENT (OUTPATIENT)
Dept: CARDIOLOGY | Facility: CLINIC | Age: 80
End: 2020-09-16

## 2020-09-17 ENCOUNTER — APPOINTMENT (OUTPATIENT)
Dept: CARDIOLOGY | Facility: CLINIC | Age: 80
End: 2020-09-17
Payer: MEDICARE

## 2020-09-17 VITALS
HEART RATE: 74 BPM | WEIGHT: 172 LBS | BODY MASS INDEX: 26.68 KG/M2 | HEIGHT: 67.5 IN | SYSTOLIC BLOOD PRESSURE: 130 MMHG | TEMPERATURE: 98.4 F | DIASTOLIC BLOOD PRESSURE: 68 MMHG | OXYGEN SATURATION: 97 %

## 2020-09-17 PROCEDURE — 99214 OFFICE O/P EST MOD 30 MIN: CPT

## 2020-09-17 RX ORDER — ASPIRIN 81 MG/1
81 TABLET ORAL DAILY
Refills: 0 | Status: ACTIVE | COMMUNITY

## 2020-09-17 RX ORDER — UBIDECARENONE/VIT E ACET 100MG-5
CAPSULE ORAL
Refills: 0 | Status: ACTIVE | COMMUNITY

## 2020-09-17 RX ORDER — ASPIRIN 325 MG/1
325 TABLET, FILM COATED ORAL DAILY
Refills: 0 | Status: DISCONTINUED | COMMUNITY
End: 2020-09-17

## 2020-09-17 RX ORDER — CHROMIUM 200 MCG
TABLET ORAL
Refills: 0 | Status: ACTIVE | COMMUNITY

## 2020-09-17 RX ORDER — PRAVASTATIN SODIUM 20 MG/1
20 TABLET ORAL
Refills: 0 | Status: ACTIVE | COMMUNITY

## 2020-09-17 NOTE — DISCUSSION/SUMMARY
[FreeTextEntry1] : Bill is a 79-year-old male with medical history detailed above and active medical issues including: \par \par -No angina symptoms and normal perfusion MPI stress test, normal LVEF July 2017. \par \par -History of severe aortic stenosis, abnormal exercise stress echo, status post single-vessel CABG, bioprosthetic AVR, aortic root repair in April 2013.\par \par - Brief PAF CHADS VASc score 3, history of subdural hematoma, moderate risk for oral anticoagulation,  electrophysiologist Dr. Saad Reyes, no AC unless prolonged PAF.\par \par -Postop heart block, [employer] dual chamber permanent pacemaker implant in April 2013.\par \par -Hypertension at goal <130/80, follow up home BP daily over one week.\par \par -History of Babesiosis/thrombocytopenia following with hematology \par \par -Dyslipidemia intolerant to statins .\par \par -Prior tobacco abuse.\par \par Advised patient to follow active lifestyle with regular cardiovascular exercise. Patient educated on lifestyle and diet modification with low sodium low fat diet and avoidance of excessive alcohol. Patient is aware to call with any symptoms or concerns. \par \par Cardiology follow-up 6 months.  Patient will have 2-D echocardiogram to assess LV systolic function, AVR, structural heart disease with TEB follow-up.  Repeat labs will be ordered with PMD.\par \par Bill will follow up with Dr. Bennett for primary care. \par

## 2020-09-17 NOTE — REASON FOR VISIT
[Follow-Up - Clinic] : a clinic follow-up of [Aortic Stenosis] : aortic stenosis [Follow-up Device Check] : follow-up device check visit [FreeTextEntry2] : bioAVR ascending aorta repair 2013, CHB, PPM 2013, syncope fall SDH CVA 2014 [FreeTextEntry1] : Bill is a 79-year-old male with history of aortic stenosis, ascending aortic aneurysm, status post single-vessel CABG, bioprosthetic AVR, and ascending aortic repair at [facility] in April 2013, postop heart block, [employer] permanent pacemaker implant in April 2013 at [place], hypertension and dyslipidemia, Babisiosis, thrombocytopenia, multiple platelet transfusion, hematology Dr Agarwal, syncopal fall head trauma SDH admit [facility] August 2014, Prostate CA radiation therapy\par \par Cardiovascular review of symptoms is negative for exertional chest pain, dyspnea, palpitations, dizziness or syncope. No PND or orthopnea leg edema. No bleeding or black stool.\par \par Patient is walking 20 minutes routine basis without exertional symptoms. He is compliant with his diet medications.\par \par Patient is walking 15 minutes without exertional chest pain.\par \par [employer] permanent pacemaker check June 2020 implanted April 2013, dual-chamber A-pacing 36% V-pacing less than 1%, battery greater than 7 years, no episodes, follow-up 3 months\par \par Echocardiogram July 2019, LVEF 60%, normal bioprosthetic AVR, mild diastolic dysfunction, mild MR, normal RVSP\par  \par Lexascan Myoview stress July 2017, LVEF 54% diaphragm attenuation, no ischemia nonischemic EKG response, baseline sinus rhythm PRWP, NSST, no angina\par \par 2-D echo April 2017, LVEF 55%, function, normal bioprosthetic AVR, mild/moderate MR, normal aortic valve gradient, mild TR, PASP 35\par \par Carotid and abdominal ultrasound April 2017, nonobstructive, proximal aorta 2.8 cm.\par  \par

## 2020-09-17 NOTE — PROCEDURE
[No] : not [NSR] : normal sinus rhythm [Pacemaker] : pacemaker [DDD] : DDD [Voltage: ___ volts] : Voltage was [unfilled] volts [Lead Imp:  ___ohms] : lead impedance was [unfilled] ohms [Sensing Amplitude ___mv] : sensing amplitude was [unfilled] mv [___V @] : [unfilled] V [___ ms] : [unfilled] ms [None] : none [Threshold Testing Performed] : Threshold testing was not performed [de-identified] : RAZA  [de-identified] : [employer]  [de-identified] : [id number] [de-identified] : 4-24-[birth year] [de-identified] : 62 [de-identified] : 8.2-9.3 years.  [de-identified] : AP 30%\par  <1 %\par 1 Episodes AF < 10s and one episode of Aflutter vs SVT <10 s\par \par He has history of this, however given prior history of syncopal fall and SDH, EP advised no AC unless prolonged AF. \par \par Continue 3 months follow up. \par

## 2020-10-07 ENCOUNTER — APPOINTMENT (OUTPATIENT)
Dept: CARDIOLOGY | Facility: CLINIC | Age: 80
End: 2020-10-07
Payer: MEDICARE

## 2020-10-07 PROCEDURE — 93280 PM DEVICE PROGR EVAL DUAL: CPT

## 2020-10-07 NOTE — PROCEDURE
[No] : not [NSR] : normal sinus rhythm [See Device Printout] : See device printout [Pacemaker] : pacemaker [DDD] : DDD [Voltage: ___ volts] : Voltage was [unfilled] volts [Threshold Testing Performed] : Threshold testing was performed [Lead Imp:  ___ohms] : lead impedance was [unfilled] ohms [Sensing Amplitude ___mv] : sensing amplitude was [unfilled] mv [___V @] : [unfilled] V [___ ms] : [unfilled] ms [None] : none [de-identified] : RAZA  [de-identified] : [employer]  [de-identified] : [id number] [de-identified] : 4-24-[birth year] [de-identified] :  [de-identified] : 6.9-8.1 years  [de-identified] : \par AP 41%\par  <1 %\par \par 1 Episodes AT < 10 sec  no definitive AF. \par He has history of this, however given prior history of syncopal fall and SDH, EP advised no AC unless prolonged AF. On ASA 81mg daily. Will continue to monitor. \par \par Continue 3 months follow up. \par \par Sincerely,\par \par SUPRIYA Nation\par Reviewed with supervising MD: Dr. Shine Wright \par

## 2020-10-07 NOTE — ADDENDUM
[FreeTextEntry1] : Please note the patient was reviewed with advanced practice provider\par I was physically present during the service of the patient\par I was directly involved in the management plan and recommendations of care provided to the patient. \par 10/07/2020\par

## 2020-10-12 ENCOUNTER — APPOINTMENT (OUTPATIENT)
Dept: CARDIOLOGY | Facility: CLINIC | Age: 80
End: 2020-10-12

## 2020-10-21 ENCOUNTER — APPOINTMENT (OUTPATIENT)
Dept: CARDIOLOGY | Facility: CLINIC | Age: 80
End: 2020-10-21
Payer: MEDICARE

## 2020-10-21 PROCEDURE — 93306 TTE W/DOPPLER COMPLETE: CPT

## 2020-12-04 ENCOUNTER — APPOINTMENT (OUTPATIENT)
Dept: CARDIOLOGY | Facility: CLINIC | Age: 80
End: 2020-12-04
Payer: MEDICARE

## 2020-12-04 ENCOUNTER — NON-APPOINTMENT (OUTPATIENT)
Age: 80
End: 2020-12-04

## 2020-12-04 VITALS
TEMPERATURE: 98.3 F | OXYGEN SATURATION: 98 % | HEART RATE: 62 BPM | HEIGHT: 68 IN | DIASTOLIC BLOOD PRESSURE: 88 MMHG | BODY MASS INDEX: 27.43 KG/M2 | SYSTOLIC BLOOD PRESSURE: 130 MMHG | WEIGHT: 181 LBS

## 2020-12-04 PROCEDURE — 93000 ELECTROCARDIOGRAM COMPLETE: CPT

## 2020-12-04 PROCEDURE — 99072 ADDL SUPL MATRL&STAF TM PHE: CPT

## 2020-12-04 PROCEDURE — 99214 OFFICE O/P EST MOD 30 MIN: CPT

## 2020-12-04 RX ORDER — TAMSULOSIN HYDROCHLORIDE 0.4 MG/1
0.4 CAPSULE ORAL
Qty: 90 | Refills: 2 | Status: ACTIVE | COMMUNITY

## 2020-12-04 RX ORDER — TAMSULOSIN HYDROCHLORIDE 0.4 MG/1
0.4 CAPSULE ORAL
Qty: 60 | Refills: 0 | Status: DISCONTINUED | COMMUNITY
Start: 2018-12-03 | End: 2020-12-04

## 2020-12-04 NOTE — PROCEDURE
[No] : not [NSR] : normal sinus rhythm [Pacemaker] : pacemaker [DDD] : DDD [Voltage: ___ volts] : Voltage was [unfilled] volts [Lead Imp:  ___ohms] : lead impedance was [unfilled] ohms [Sensing Amplitude ___mv] : sensing amplitude was [unfilled] mv [___V @] : [unfilled] V [___ ms] : [unfilled] ms [None] : none [Threshold Testing Performed] : Threshold testing was not performed [de-identified] : RAZA  [de-identified] : [employer]  [de-identified] : [id number] [de-identified] : [birth year] [de-identified] : 62 [de-identified] : 8.2-9.3 years.  [de-identified] : AP 30%\par  <1 %\par 1 Episodes AF < 10s and one episode of Aflutter vs SVT <10 s\par \par He has history of this, however given prior history of syncopal fall and SDH, EP advised no AC unless prolonged AF. \par \par Continue 3 months follow up. \par

## 2020-12-04 NOTE — REASON FOR VISIT
[Follow-Up - Clinic] : a clinic follow-up of [Follow-up Device Check] : follow-up device check visit [FreeTextEntry2] : noninvasive testing for bioAVR ascending aorta repair 2013, CHB, PPM 2013, syncope fall SDH CVA 2014 [FreeTextEntry1] : Bill is a 80-year-old male with history of aortic stenosis, ascending aortic aneurysm, status post single-vessel CABG, bioprosthetic AVR, and ascending aortic repair at [facility] in April 2013, postop heart block, [employer] permanent pacemaker implant in April 2013 at [place], hypertension and dyslipidemia, Babisiosis, thrombocytopenia, multiple platelet transfusion, hematology Dr Agarwal, syncopal fall head trauma SDH admit [facility] August 2014, Prostate CA radiation therapy\par \par Cardiovascular review of symptoms is negative for exertional chest pain, dyspnea, palpitations, dizziness or syncope. No PND or orthopnea leg edema. No bleeding or black stool.\par \par Patient is walking 20 minutes routine basis without exertional symptoms. He is compliant with his diet medications.\par \par Patient is walking 15 minutes without exertional chest pain.\par \par [employer] permanent pacemaker check  implanted April 2013, dual-chamber A-pacing 36% V-pacing less than 1%, battery greater than 7 years, no episodes, follow-up 3 months\par \par Echocardiogram Oct 2020, LVEF 55-60%, normal bioprosthetic AVR, mild MR and TR, mild pulmonary hypertension\par \par Echocardiogram July 2019, LVEF 60%, normal bioprosthetic AVR, mild diastolic dysfunction, mild MR, normal RVSP\par  \par Lexascan Myoview stress July 2017, LVEF 54% diaphragm attenuation, no ischemia nonischemic EKG response, baseline sinus rhythm PRWP, NSST, no angina\par \par 2-D echo April 2017, LVEF 55%, function, normal bioprosthetic AVR, mild/moderate MR, normal aortic valve gradient, mild TR, PASP 35\par \par Carotid and abdominal ultrasound April 2017, nonobstructive, proximal aorta 2.8 cm.\par  \par

## 2020-12-04 NOTE — DISCUSSION/SUMMARY
[FreeTextEntry1] : Bill is a 80-year-old male with medical history detailed above and active medical issues including: \par \par - No angina symptoms and normal perfusion MPI stress test, normal LVEF July 2017.  In the setting of Covid 19 pandemic we will discuss the need for stress testing next office visit.\par \par - History of severe aortic stenosis, abnormal exercise stress echo, status post single-vessel CABG, bioprosthetic AVR, aortic root repair in April 2013.\par \par - Brief PAF CHADS VASc score 3, history of subdural hematoma, moderate risk for oral anticoagulation,  electrophysiologist Dr. Saad Reyes, no AC unless prolonged PAF.\par \par - Postop heart block, [employer] dual chamber permanent pacemaker implant in April 2013.\par \par - Hypertension at goal <130/80, follow up home BP daily over one week.\par \par - History of Babesiosis/thrombocytopenia following with hematology \par \par - Dyslipidemia intolerant to statins .\par \par - Prior tobacco abuse.\par \par Advised patient to follow active lifestyle with regular cardiovascular exercise. Patient educated on lifestyle and diet modification with low sodium low fat diet and avoidance of excessive alcohol. Patient is aware to call with any symptoms or concerns. \par \par Cardiology follow-up 6 months.  Current cardiac medications remain unchanged. Repeat labs will be ordered with PMD.\par \par Bill will follow up with Dr. Bennett for primary care. \par

## 2021-01-13 ENCOUNTER — APPOINTMENT (OUTPATIENT)
Dept: CARDIOLOGY | Facility: CLINIC | Age: 81
End: 2021-01-13
Payer: MEDICARE

## 2021-01-13 PROCEDURE — 93280 PM DEVICE PROGR EVAL DUAL: CPT

## 2021-01-13 PROCEDURE — 99072 ADDL SUPL MATRL&STAF TM PHE: CPT

## 2021-01-14 NOTE — PROCEDURE
[No] : not [NSR] : normal sinus rhythm [See Device Printout] : See device printout [Pacemaker] : pacemaker [DDD] : DDD [Voltage: ___ volts] : Voltage was [unfilled] volts [Threshold Testing Performed] : Threshold testing was performed [Lead Imp:  ___ohms] : lead impedance was [unfilled] ohms [Sensing Amplitude ___mv] : sensing amplitude was [unfilled] mv [___V @] : [unfilled] V [___ ms] : [unfilled] ms [None] : none [Counters Reset] : the counters were reset [de-identified] : RAZA  [de-identified] : [employer]  [de-identified] : [id number] [de-identified] : 4-24-[birth year] [de-identified] :  [de-identified] : 6.1-7.2 years  [de-identified] : \par %\par  <1 %\par \par 2 Episodes brief AT < 10 sec,  no definitive AF. \par \par He has history of this, however given prior history of syncopal fall and SDH, EP advised no AC unless prolonged AF. On ASA 81mg daily. Will continue to monitor. \par \par Per guidelines check device in office 6 mo, pt declines remote monitoring. \par \par Sincerely,\par \par SUPRIYA Nation\par Supervising MD: Dr. Lliiana Kendrick

## 2021-02-19 ENCOUNTER — NON-APPOINTMENT (OUTPATIENT)
Age: 81
End: 2021-02-19

## 2021-03-29 ENCOUNTER — APPOINTMENT (OUTPATIENT)
Dept: CARDIOLOGY | Facility: CLINIC | Age: 81
End: 2021-03-29
Payer: MEDICARE

## 2021-03-29 ENCOUNTER — NON-APPOINTMENT (OUTPATIENT)
Age: 81
End: 2021-03-29

## 2021-03-29 VITALS
HEIGHT: 68 IN | WEIGHT: 174 LBS | BODY MASS INDEX: 26.37 KG/M2 | DIASTOLIC BLOOD PRESSURE: 80 MMHG | HEART RATE: 68 BPM | OXYGEN SATURATION: 96 % | SYSTOLIC BLOOD PRESSURE: 132 MMHG

## 2021-03-29 PROCEDURE — 99072 ADDL SUPL MATRL&STAF TM PHE: CPT

## 2021-03-29 PROCEDURE — 99214 OFFICE O/P EST MOD 30 MIN: CPT

## 2021-03-29 PROCEDURE — 93000 ELECTROCARDIOGRAM COMPLETE: CPT | Mod: NC

## 2021-03-29 RX ORDER — BIMATOPROST 0.1 MG/ML
0.01 SOLUTION/ DROPS OPHTHALMIC
Qty: 2 | Refills: 0 | Status: ACTIVE | COMMUNITY
Start: 2021-02-16

## 2021-03-30 NOTE — ASSESSMENT
[FreeTextEntry1] : ROMA PULIDO is a 80 year old M who presents today Mar 29, 2021 with the above history and the following active issues:\par \par Preoperative cardiovascular examination.\par Patient may proceed with left knee surgery at [facility] with Dr. Flores\par At present, there are no active cardiac conditions. \par No recent unstable coronary syndromes, decompensated heart failure, severe valvular heart disease or significant dysrhythmias.  \par Baseline functional status is acceptable.    \par The clinical benefit of the proposed procedure outweighs the associated cardiovascular risk.  \par Risk not attenuated with further CV testing.  \par Prior testing as outlined above.\par Optimized from a cardiovascular perspective.\par Remain on ASA\par Continue beta blocker\par DVT ppx\par Will need SBE PPX. Amoxicillin sent to pharmacy on file.\par \par \par - No angina symptoms and normal perfusion MPI stress test, normal LVEF July 2017.\par \par - History of severe aortic stenosis, abnormal exercise stress echo, status post single-vessel CABG, bioprosthetic AVR, aortic root repair in April 2013.\par \par - Brief PAF CHADS VASc score 3, history of subdural hematoma, moderate risk for oral anticoagulation, electrophysiologist Dr. Saad Reyes, no AC unless prolonged PAF.\par \par - Postop heart block, [employer] Kirit dual chamber permanent pacemaker implant in April 2013.\par \par - Hypertension at goal <130/80, follow up home BP daily over one week.\par \par - History of Babesiosis/thrombocytopenia following with hematology \par \par - Dyslipidemia intolerant to statins.\par \par - Smoking cessation advised.\par \par Ongoing f/u with PCP.\par \par F/U in 2 months with Dr. Valentino.\par Discussed red flag symptoms, which would warrant sooner or emergent medical evaluation.\par Any questions and concerns were addressed and resolved.\par \par Sincerely,\par Kathleen Manzanares [facility]-BC\par Patient's history, testing, and plan was reviewed with supervising physician, Dr. Liliana Kendrick

## 2021-03-30 NOTE — HISTORY OF PRESENT ILLNESS
[FreeTextEntry1] : ROMA PULIDO is a 80 year old male with a past medical history of aortic stenosis, ascending aortic aneurysm, status post single-vessel CABG, bioprosthetic AVR, and ascending aortic repair at [facility] in April 2013, postop heart block, [employer]irit permanent pacemaker implant in April 2013 at [place], hypertension and dyslipidemia, Babisiosis, thrombocytopenia, multiple platelet transfusion, hematology Dr Agarwal, syncopal fall head trauma SDH admit [facility] August 2014, Prostate CA radiation.\par \par Last seen by Dr. Valentino 12/4/20. In the interim there have been no hospitalizations or procedures. Presents today, 3/29/21, for cardiac clearance prior to left knee surgery with Dr. Flores at [facility], date TBD. He denies chest pain, pressure, palpitations, unusual shortness of breath, orthopnea, LE edema, lightheadedness, dizziness, near syncope or syncope. Smokes 1 pack of cigarettes per month. Not on a formal exercise regimen. Able to climb 2 flights of stairs without CP or SOB.\par \par Testing:\par \par EKG 3/29/21: SR at 63 bpm, nonspecific ST-T wave abnormalities, NH interval 168 ms, QTc 419\par \par [employer] permanent pacemaker check implanted April 2013, dual-chamber A-pacing 36% V-pacing less than 1%, battery greater than 7 years, no episodes, follow-up 3 months\par \par Echocardiogram Oct 2020, LVEF 55-60%, normal bioprosthetic AVR, mild MR and TR, mild pulmonary hypertension\par \par Echocardiogram July 2019, LVEF 60%, normal bioprosthetic AVR, mild diastolic dysfunction, mild MR, normal RVSP\par  \par Lexascan Myoview stress July 2017, LVEF 54% diaphragm attenuation, no ischemia nonischemic EKG response, baseline sinus rhythm PRWP, NSST, no angina\par \par 2-D echo April 2017, LVEF 55%, function, normal bioprosthetic AVR, mild/moderate MR, normal aortic valve gradient, mild TR, PASP 35\par \par Carotid and abdominal ultrasound April 2017, nonobstructive, proximal aorta 2.8 cm.

## 2021-03-30 NOTE — PHYSICAL EXAM
[General Appearance - Well Developed] : well developed [Normal Appearance] : normal appearance [Well Groomed] : well groomed [General Appearance - Well Nourished] : well nourished [No Deformities] : no deformities [General Appearance - In No Acute Distress] : no acute distress [Normal Conjunctiva] : the conjunctiva exhibited no abnormalities [Eyelids - No Xanthelasma] : the eyelids demonstrated no xanthelasmas [Normal Oral Mucosa] : normal oral mucosa [No Oral Pallor] : no oral pallor [No Oral Cyanosis] : no oral cyanosis [Normal Jugular Venous A Waves Present] : normal jugular venous A waves present [Normal Jugular Venous V Waves Present] : normal jugular venous V waves present [No Jugular Venous Paiz A Waves] : no jugular venous paiz A waves [Respiration, Rhythm And Depth] : normal respiratory rhythm and effort [Exaggerated Use Of Accessory Muscles For Inspiration] : no accessory muscle use [Auscultation Breath Sounds / Voice Sounds] : lungs were clear to auscultation bilaterally [Heart Rate And Rhythm] : heart rate and rhythm were normal [Heart Sounds] : normal S1 and S2 [Abdomen Soft] : soft [Abdomen Tenderness] : non-tender [Abdomen Mass (___ Cm)] : no abdominal mass palpated [Abnormal Walk] : normal gait [Gait - Sufficient For Exercise Testing] : the gait was sufficient for exercise testing [Nail Clubbing] : no clubbing of the fingernails [Cyanosis, Localized] : no localized cyanosis [Petechial Hemorrhages (___cm)] : no petechial hemorrhages [Skin Color & Pigmentation] : normal skin color and pigmentation [] : no rash [No Venous Stasis] : no venous stasis [Skin Lesions] : no skin lesions [No Skin Ulcers] : no skin ulcer [No Xanthoma] : no  xanthoma was observed [Oriented To Time, Place, And Person] : oriented to person, place, and time [Affect] : the affect was normal [Mood] : the mood was normal [No Anxiety] : not feeling anxious [FreeTextEntry1] :  A grade 3/6 systolic murmur was heard. \par 2/6 KRYSTINA, nl AVR click, right upper chest PPM.

## 2021-07-20 ENCOUNTER — APPOINTMENT (OUTPATIENT)
Dept: CARDIOLOGY | Facility: CLINIC | Age: 81
End: 2021-07-20
Payer: MEDICARE

## 2021-07-20 VITALS
SYSTOLIC BLOOD PRESSURE: 110 MMHG | HEART RATE: 69 BPM | DIASTOLIC BLOOD PRESSURE: 70 MMHG | BODY MASS INDEX: 25.46 KG/M2 | HEIGHT: 68 IN | TEMPERATURE: 98 F | OXYGEN SATURATION: 98 % | WEIGHT: 168 LBS

## 2021-07-20 PROCEDURE — 99072 ADDL SUPL MATRL&STAF TM PHE: CPT

## 2021-07-20 PROCEDURE — 93280 PM DEVICE PROGR EVAL DUAL: CPT

## 2021-07-20 PROCEDURE — 99214 OFFICE O/P EST MOD 30 MIN: CPT

## 2021-07-20 RX ORDER — AMOXICILLIN 500 MG/1
500 TABLET, FILM COATED ORAL
Qty: 4 | Refills: 0 | Status: DISCONTINUED | COMMUNITY
Start: 2021-03-29 | End: 2021-07-20

## 2021-07-20 NOTE — DISCUSSION/SUMMARY
[FreeTextEntry1] : Bill is a 80-year-old male with medical history detailed above and active medical issues including: \par \par - Dyspnea on exertion, multiple CAD risk factors, 1V CABG bio AVR 2013.  Patient will have noninvasive testing with a Lexiscan Myoview stress test to assess for obstructive CAD. \par \par - History of severe aortic stenosis, abnormal exercise stress echo, status post single-vessel CABG, bioprosthetic AVR, aortic root repair in April 2013.\par \par - Brief PAF CHADS VASc score 3, history of subdural hematoma, moderate risk for oral anticoagulation,  electrophysiologist Dr. Saad Reyes, no AC unless prolonged PAF.\par \par - Postop heart block, [employer] Kirit dual chamber permanent pacemaker implant in April 2013.\par \par - Hypertension at goal <130/80, follow up home BP daily over one week.\par \par - History of Babesiosis/thrombocytopenia following with hematology \par \par - Dyslipidemia intolerant to statins .\par \par - Prior tobacco abuse.\par \par Advised patient to follow active lifestyle with regular cardiovascular exercise. Patient educated on lifestyle and diet modification with low sodium low fat diet and avoidance of excessive alcohol. Patient is aware to call with any symptoms or concerns. \par \par Patient will be seen in cardiology follow-up after noninvasive testing.  Current cardiac medications remain unchanged. Repeat labs will be ordered with PMD.\par \par Bill will follow up with Dr. Bennett for primary care. \par

## 2021-07-20 NOTE — PROCEDURE
[No] : not [NSR] : normal sinus rhythm [Pacemaker] : pacemaker [DDD] : DDD [Voltage: ___ volts] : Voltage was [unfilled] volts [Lead Imp:  ___ohms] : lead impedance was [unfilled] ohms [Sensing Amplitude ___mv] : sensing amplitude was [unfilled] mv [___V @] : [unfilled] V [___ ms] : [unfilled] ms [None] : none [Threshold Testing Performed] : Threshold testing was not performed [de-identified] : RAZA  [de-identified] : [employer]  [de-identified] : [id number] [de-identified] : [birth year] [de-identified] : 62 [de-identified] : 8.2-9.3 years.  [de-identified] : AP 30%\par  <1 %\par 1 Episodes AF < 10s and one episode of Aflutter vs SVT <10 s\par \par He has history of this, however given prior history of syncopal fall and SDH, EP advised no AC unless prolonged AF. \par \par Continue 3 months follow up. \par

## 2021-07-20 NOTE — PROCEDURE
[No] : not [NSR] : normal sinus rhythm [See Device Printout] : See device printout [Pacemaker] : pacemaker [DDD] : DDD [Voltage: ___ volts] : Voltage was [unfilled] volts [Threshold Testing Performed] : Threshold testing was performed [Lead Imp:  ___ohms] : lead impedance was [unfilled] ohms [Sensing Amplitude ___mv] : sensing amplitude was [unfilled] mv [___V @] : [unfilled] V [___ ms] : [unfilled] ms [None] : none [Counters Reset] : the counters were reset [de-identified] : RAZA  [de-identified] : [employer]  [de-identified] : [id number] [de-identified] : [birth year] [de-identified] :  [de-identified] : 5.1-6.4 years [de-identified] : \par AP 35%\par  <1 %\par \par Settings:\par RA: Amplitude 2.5V, pulse width 0.4 ms, sensitivity 0.1 mV\par RV: Ampltidue 1.25 (auto), pulse width 0.4 ms, sensitivity 2mV\par \par 2 Episodes brief AT < 10 sec,  no definitive AF. \par \par He has history of this, however given prior history of syncopal fall and SDH, EP advised no AC unless prolonged AF. On ASA 81mg daily. Will continue to monitor. \par \par Pt declines remote monitoring. \par \par F/U: DVC in 3 months\par Discussed red flag symptoms, which would warrant sooner or emergent medical evaluation.\par Any questions and concerns were addressed and resolved.\par \par Sincerely,\par Kathleen Manzanares FN-BC\par Patient's history, testing, and plan was reviewed with supervising physician, Dr. Patrick Valentino

## 2021-07-20 NOTE — PHYSICAL EXAM
[General Appearance - Well Developed] : well developed [Normal Appearance] : normal appearance [Well Groomed] : well groomed [General Appearance - Well Nourished] : well nourished [No Deformities] : no deformities [General Appearance - In No Acute Distress] : no acute distress [Normal Conjunctiva] : the conjunctiva exhibited no abnormalities [Eyelids - No Xanthelasma] : the eyelids demonstrated no xanthelasmas [Normal Oral Mucosa] : normal oral mucosa [No Oral Pallor] : no oral pallor [No Oral Cyanosis] : no oral cyanosis [Normal Jugular Venous A Waves Present] : normal jugular venous A waves present [Normal Jugular Venous V Waves Present] : normal jugular venous V waves present [No Jugular Venous Paiz A Waves] : no jugular venous paiz A waves [Respiration, Rhythm And Depth] : normal respiratory rhythm and effort [Exaggerated Use Of Accessory Muscles For Inspiration] : no accessory muscle use [Auscultation Breath Sounds / Voice Sounds] : lungs were clear to auscultation bilaterally [Heart Rate And Rhythm] : heart rate and rhythm were normal [Heart Sounds] : normal S1 and S2 [Systolic grade ___/6] : A grade [unfilled]/6 systolic murmur was heard. [Abdomen Soft] : soft [Abdomen Tenderness] : non-tender [Abdomen Mass (___ Cm)] : no abdominal mass palpated [Gait - Sufficient For Exercise Testing] : the gait was sufficient for exercise testing [Abnormal Walk] : normal gait [Nail Clubbing] : no clubbing of the fingernails [Cyanosis, Localized] : no localized cyanosis [Petechial Hemorrhages (___cm)] : no petechial hemorrhages [Skin Color & Pigmentation] : normal skin color and pigmentation [] : no rash [No Venous Stasis] : no venous stasis [Skin Lesions] : no skin lesions [No Skin Ulcers] : no skin ulcer [No Xanthoma] : no  xanthoma was observed [Affect] : the affect was normal [Oriented To Time, Place, And Person] : oriented to person, place, and time [Mood] : the mood was normal [No Anxiety] : not feeling anxious [FreeTextEntry1] : 2/6 KRYSTINA, nl AVR click, right upper chest PPM

## 2021-07-20 NOTE — REASON FOR VISIT
[Follow-Up - Clinic] : a clinic follow-up of [Follow-up Device Check] : follow-up device check visit [Other: ____] : [unfilled] [FreeTextEntry1] : Bill is a 80-year-old male with history of aortic stenosis, ascending aortic aneurysm, status post single-vessel CABG, bioprosthetic AVR, and ascending aortic repair at [facility] in April 2013, postop heart block, [employer] permanent pacemaker implant in April 2013 at [place], hypertension and dyslipidemia, Babisiosis, thrombocytopenia, multiple platelet transfusion, hematology Dr Agarwal, syncopal fall head trauma SDH admit [facility] August 2014, Prostate CA radiation therapy\par \par Cardiovascular review of symptoms is negative for exertional chest pain, dyspnea, palpitations, dizziness or syncope. No PND or orthopnea leg edema. No bleeding or black stool.\par \par Patient is walking  < 5 minutes with left knee pain, unable to perform treadmill stress test.  Patient will be scheduled for pharmacologic stress test. \par \par [employer] permanent pacemaker check  implanted April 2013, dual-chamber A-pacing 36% V-pacing less than 1%, battery greater than 7 years, no episodes, follow-up 3 months\par \par Echocardiogram Oct 2020, LVEF 55-60%, normal bioprosthetic AVR, mild MR and TR, mild pulmonary hypertension\par \par Echocardiogram July 2019, LVEF 60%, normal bioprosthetic AVR, mild diastolic dysfunction, mild MR, normal RVSP\par  \par Lexascan Myoview stress July 2017, LVEF 54% diaphragm attenuation, no ischemia nonischemic EKG response, baseline sinus rhythm PRWP, NSST, no angina\par \par 2-D echo April 2017, LVEF 55%, function, normal bioprosthetic AVR, mild/moderate MR, normal aortic valve gradient, mild TR, PASP 35\par \par Carotid and abdominal ultrasound April 2017, nonobstructive, proximal aorta 2.8 cm.\par  \par  [FreeTextEntry2] : noninvasive testing for bioAVR ascending aorta repair 2013, CHB, PPM 2013, syncope fall SDH CVA 2014

## 2021-10-12 ENCOUNTER — APPOINTMENT (OUTPATIENT)
Dept: CARDIOLOGY | Facility: CLINIC | Age: 81
End: 2021-10-12
Payer: MEDICARE

## 2021-10-12 PROCEDURE — 93306 TTE W/DOPPLER COMPLETE: CPT

## 2021-10-12 PROCEDURE — 93979 VASCULAR STUDY: CPT

## 2021-10-12 PROCEDURE — 93880 EXTRACRANIAL BILAT STUDY: CPT

## 2021-10-13 ENCOUNTER — APPOINTMENT (OUTPATIENT)
Dept: CARDIOLOGY | Facility: CLINIC | Age: 81
End: 2021-10-13
Payer: MEDICARE

## 2021-10-13 PROCEDURE — 93280 PM DEVICE PROGR EVAL DUAL: CPT

## 2021-10-13 RX ORDER — LOSARTAN POTASSIUM 25 MG/1
25 TABLET, FILM COATED ORAL
Qty: 90 | Refills: 1 | Status: ACTIVE | COMMUNITY
Start: 2021-01-26

## 2021-10-13 RX ORDER — AMOXICILLIN 500 MG/1
500 CAPSULE ORAL
Qty: 4 | Refills: 0 | Status: DISCONTINUED | COMMUNITY
Start: 2021-03-29 | End: 2021-10-13

## 2021-10-13 NOTE — PROCEDURE
[No] : not [NSR] : normal sinus rhythm [See Device Printout] : See device printout [Pacemaker] : pacemaker [DDD] : DDD [Voltage: ___ volts] : Voltage was [unfilled] volts [Threshold Testing Performed] : Threshold testing was performed [Lead Imp:  ___ohms] : lead impedance was [unfilled] ohms [Sensing Amplitude ___mv] : sensing amplitude was [unfilled] mv [___V @] : [unfilled] V [___ ms] : [unfilled] ms [None] : none [Counters Reset] : the counters were reset [de-identified] : [employer]  [de-identified] : RAZA  [de-identified] : [id number] [de-identified] : [birth year] [de-identified] :  [de-identified] : 5.3-6.3years [de-identified] : \par AP 40%\par  <1 %\par \par Settings:\par RA: Amplitude 2.5V, pulse width 0.4 ms, sensitivity 0.1 mV\par RV: Ampltidue 1.25 (auto), pulse width 0.4 ms, sensitivity 2mV\par \par 1 Episodes brief AT < 10 sec,  no definitive AF. \par \par He has history of this, however given prior history of syncopal fall and SDH, EP advised no AC unless prolonged AF. On ASA 81mg daily. Will continue to monitor. \par \par Pt declines remote monitoring. \par \par F/U: DVC in 3 months\par Discussed red flag symptoms, which would warrant sooner or emergent medical evaluation.\par Any questions and concerns were addressed and resolved.\par \par Sincerely,\par \par Danielle Cassidy PA-C\par Patients history, testing and plan reviewed with supervising MD: Dr. Patrick Valentino

## 2021-10-19 ENCOUNTER — APPOINTMENT (OUTPATIENT)
Dept: CARDIOLOGY | Facility: CLINIC | Age: 81
End: 2021-10-19
Payer: MEDICARE

## 2021-10-19 PROCEDURE — 93015 CV STRESS TEST SUPVJ I&R: CPT

## 2021-10-19 PROCEDURE — A9502: CPT

## 2021-10-19 PROCEDURE — 78452 HT MUSCLE IMAGE SPECT MULT: CPT

## 2022-01-14 ENCOUNTER — APPOINTMENT (OUTPATIENT)
Dept: CARDIOLOGY | Facility: CLINIC | Age: 82
End: 2022-01-14
Payer: MEDICARE

## 2022-01-14 VITALS
SYSTOLIC BLOOD PRESSURE: 138 MMHG | HEIGHT: 68 IN | OXYGEN SATURATION: 97 % | DIASTOLIC BLOOD PRESSURE: 70 MMHG | WEIGHT: 174 LBS | BODY MASS INDEX: 26.37 KG/M2 | HEART RATE: 70 BPM

## 2022-01-14 PROCEDURE — 93280 PM DEVICE PROGR EVAL DUAL: CPT

## 2022-01-14 NOTE — PROCEDURE
[No] : not [NSR] : normal sinus rhythm [See Device Printout] : See device printout [Pacemaker] : pacemaker [DDD] : DDD [Voltage: ___ volts] : Voltage was [unfilled] volts [Threshold Testing Performed] : Threshold testing was performed [Lead Imp:  ___ohms] : lead impedance was [unfilled] ohms [Sensing Amplitude ___mv] : sensing amplitude was [unfilled] mv [___V @] : [unfilled] V [___ ms] : [unfilled] ms [None] : none [Counters Reset] : the counters were reset [de-identified] : RAZA  [de-identified] : [employer]  [de-identified] : [id number] [de-identified] : [birth year] [de-identified] :  [de-identified] : 5.3-6.3years [de-identified] : \par AP 41%\par  <1 %\par \par Settings:\par RA: Amplitude 2.5V, pulse width 0.4 ms, sensitivity 0.1 mV\par RV: Ampltidue 1.25 (auto), pulse width 0.4 ms, sensitivity 2mV\par \par AT/AF burdeon 0%\par \par He has history of this, however given prior history of syncopal fall and SDH, EP advised no AC unless prolonged AF. On ASA 81mg daily. Will continue to monitor. \par \par Pt declines remote monitoring. \par \par F/U: DVC in 3 months\par Discussed red flag symptoms, which would warrant sooner or emergent medical evaluation.\par Any questions and concerns were addressed and resolved.\par \par

## 2022-05-05 ENCOUNTER — APPOINTMENT (OUTPATIENT)
Dept: CARDIOLOGY | Facility: CLINIC | Age: 82
End: 2022-05-05

## 2022-05-05 ENCOUNTER — APPOINTMENT (OUTPATIENT)
Dept: CARDIOLOGY | Facility: CLINIC | Age: 82
End: 2022-05-05
Payer: MEDICARE

## 2022-05-05 VITALS
BODY MASS INDEX: 26.37 KG/M2 | SYSTOLIC BLOOD PRESSURE: 124 MMHG | HEART RATE: 64 BPM | HEIGHT: 68 IN | OXYGEN SATURATION: 97 % | DIASTOLIC BLOOD PRESSURE: 64 MMHG | WEIGHT: 174 LBS | TEMPERATURE: 98 F

## 2022-05-05 PROCEDURE — 93280 PM DEVICE PROGR EVAL DUAL: CPT

## 2022-05-05 RX ORDER — PREDNISOLONE ACETATE 10 MG/ML
1 SUSPENSION/ DROPS OPHTHALMIC
Qty: 5 | Refills: 0 | Status: DISCONTINUED | COMMUNITY
Start: 2021-02-16 | End: 2022-05-05

## 2022-05-05 RX ORDER — OXYCODONE AND ACETAMINOPHEN 5; 325 MG/1; MG/1
5-325 TABLET ORAL
Qty: 7 | Refills: 0 | Status: DISCONTINUED | COMMUNITY
Start: 2021-04-16 | End: 2022-05-05

## 2022-05-05 RX ORDER — KETOROLAC TROMETHAMINE 5 MG/ML
0.5 SOLUTION OPHTHALMIC
Qty: 5 | Refills: 0 | Status: DISCONTINUED | COMMUNITY
Start: 2021-02-16 | End: 2022-05-05

## 2022-05-05 NOTE — PHYSICAL EXAM
[General Appearance - Well Developed] : well developed [Normal Appearance] : normal appearance [Well Groomed] : well groomed [General Appearance - Well Nourished] : well nourished [No Deformities] : no deformities [General Appearance - In No Acute Distress] : no acute distress [Normal Conjunctiva] : the conjunctiva exhibited no abnormalities [Eyelids - No Xanthelasma] : the eyelids demonstrated no xanthelasmas [Normal Oral Mucosa] : normal oral mucosa [No Oral Pallor] : no oral pallor [No Oral Cyanosis] : no oral cyanosis [Normal Jugular Venous A Waves Present] : normal jugular venous A waves present [Normal Jugular Venous V Waves Present] : normal jugular venous V waves present [No Jugular Venous Paiz A Waves] : no jugular venous paiz A waves [Respiration, Rhythm And Depth] : normal respiratory rhythm and effort [Exaggerated Use Of Accessory Muscles For Inspiration] : no accessory muscle use [Auscultation Breath Sounds / Voice Sounds] : lungs were clear to auscultation bilaterally [Heart Rate And Rhythm] : heart rate and rhythm were normal [Heart Sounds] : normal S1 and S2 [Systolic grade ___/6] : A grade [unfilled]/6 systolic murmur was heard. [FreeTextEntry1] : 2/6 KRYSTINA, nl AVR click, right upper chest PPM [Abdomen Soft] : soft [Abdomen Tenderness] : non-tender [Abdomen Mass (___ Cm)] : no abdominal mass palpated [Abnormal Walk] : normal gait [Gait - Sufficient For Exercise Testing] : the gait was sufficient for exercise testing [Nail Clubbing] : no clubbing of the fingernails [Cyanosis, Localized] : no localized cyanosis [Petechial Hemorrhages (___cm)] : no petechial hemorrhages [Skin Color & Pigmentation] : normal skin color and pigmentation [] : no rash [No Venous Stasis] : no venous stasis [Skin Lesions] : no skin lesions [No Skin Ulcers] : no skin ulcer [No Xanthoma] : no  xanthoma was observed [Oriented To Time, Place, And Person] : oriented to person, place, and time [Affect] : the affect was normal [Mood] : the mood was normal [No Anxiety] : not feeling anxious

## 2022-05-05 NOTE — PROCEDURE
[No] : not [NSR] : normal sinus rhythm [Pacemaker] : pacemaker [DDD] : DDD [Voltage: ___ volts] : Voltage was [unfilled] volts [Threshold Testing Performed] : Threshold testing was not performed [Lead Imp:  ___ohms] : lead impedance was [unfilled] ohms [Sensing Amplitude ___mv] : sensing amplitude was [unfilled] mv [___V @] : [unfilled] V [___ ms] : [unfilled] ms [None] : none [de-identified] : RAZA  [de-identified] : [employer]  [de-identified] : [id number] [de-identified] : [birth year] [de-identified] : 62 [de-identified] : 8.2-9.3 years.  [de-identified] : AP 30%\par  <1 %\par 1 Episodes AF < 10s and one episode of Aflutter vs SVT <10 s\par \par He has history of this, however given prior history of syncopal fall and SDH, EP advised no AC unless prolonged AF. \par \par Continue 3 months follow up. \par

## 2022-05-05 NOTE — REASON FOR VISIT
[FreeTextEntry1] : Bill is a 80-year-old male with history of aortic stenosis, ascending aortic aneurysm, status post single-vessel CABG, bioprosthetic AVR, and ascending aortic repair at [facility] in April 2013, postop heart block, [employer] permanent pacemaker implant in April 2013 at [place], hypertension and dyslipidemia, Babisiosis, thrombocytopenia, multiple platelet transfusion, hematology Dr Agarwal, syncopal fall head trauma SDH admit [facility] August 2014, Prostate CA radiation therapy\par \par Cardiovascular review of symptoms is negative for exertional chest pain, dyspnea, palpitations, dizziness or syncope. No PND or orthopnea leg edema. No bleeding or black stool.\par \par Patient is walking 20 minutes routine basis without exertional symptoms. He is compliant with his diet medications.\par \par Patient is walking 15 minutes without exertional chest pain.\par \par [employer] permanent pacemaker check  implanted April 2013, dual-chamber A-pacing 36% V-pacing less than 1%, battery greater than 7 years, no episodes, follow-up 3 months\par \par Echocardiogram Oct 2020, LVEF 55-60%, normal bioprosthetic AVR, mild MR and TR, mild pulmonary hypertension\par \par Echocardiogram July 2019, LVEF 60%, normal bioprosthetic AVR, mild diastolic dysfunction, mild MR, normal RVSP\par  \par Lexascan Myoview stress July 2017, LVEF 54% diaphragm attenuation, no ischemia nonischemic EKG response, baseline sinus rhythm PRWP, NSST, no angina\par \par 2-D echo April 2017, LVEF 55%, function, normal bioprosthetic AVR, mild/moderate MR, normal aortic valve gradient, mild TR, PASP 35\par \par Carotid and abdominal ultrasound April 2017, nonobstructive, proximal aorta 2.8 cm.\par  \par  [Follow-Up - Clinic] : a clinic follow-up of [FreeTextEntry2] : noninvasive testing for bioAVR ascending aorta repair 2013, CHB, PPM 2013, syncope fall SDH CVA 2014 [Follow-up Device Check] : follow-up device check visit

## 2022-05-05 NOTE — ASSESSMENT
[FreeTextEntry1] : ROMA PULIDO is a 81 year old M who presents today May 05, 2022 with the above history and the following active issues:\par \par - No angina symptoms and normal perfusion MPI stress test, normal LVEF July 2017.\par \par - History of severe aortic stenosis, abnormal exercise stress echo, status post single-vessel CABG, bioprosthetic AVR, aortic root repair in April 2013.\par \par - Brief PAF CHADS VASc score 3, history of subdural hematoma, moderate risk for oral anticoagulation, electrophysiologist Dr. Saad Reyes, no AC unless prolonged PAF.\par \par - Postop heart block, [employer] dual chamber permanent pacemaker implant in April 2013.\par \par - Hypertension at goal <130/80, follow up home BP daily over one week.\par \par - History of Babesiosis/thrombocytopenia following with hematology \par \par - Dyslipidemia intolerant to statins.\par \par - Smoking cessation advised.\par \par Ongoing f/u with PCP.\par

## 2022-05-05 NOTE — HISTORY OF PRESENT ILLNESS
[FreeTextEntry1] : ROMA PULIDO is a 81 year old male with a past medical history of aortic stenosis, ascending aortic aneurysm, status post single-vessel CABG, bioprosthetic AVR, and ascending aortic repair at [facility] in April 2013, postop heart block, [employer] permanent pacemaker implant in April 2013 at [place], hypertension and dyslipidemia, Babisiosis, thrombocytopenia, multiple platelet transfusion, hematology Dr Agarwal, syncopal fall head trauma SDH admit [facility] August 2014, Prostate CA radiation.\par \par Last seen by 7/2021. Testing orderd. See detail erika.  Smokes 1 pack of cigarettes per month. Not on a formal exercise regimen. Able to climb 2 flights of stairs without CP or SOB.\par \par Testing:\par \par Echo 10/12/21: EF 60%. Mild MR. Bioprosthetic arotic valve. No AR. Severely dilated LA. Mild concentric LVH. Normal wall motion. \par \par Carotid 10/12/21: Mild nonobstrucitve carotid dz seen BL.\par \par ABd u/s 10/12/21: Ectatic abd aorta in prox position, mild plaque. Largest aortic diameter 2.8 cm.\par \par Nuke 10/19/21: Lexiscan. Atypical chest discomfort. Negative by EKG.  There is a medium sized, mild defect in lateral wall that is reversible, with normal motion and sig correction by prone images most likely suggestive of mild ischemia apical/lateral. The observed defects partially correct with prone imaging. EF 56%.\par \par Labs 8/10/21: PSA 0.4\par \par Labs 4/13/21: Na 141, K 4.2, Cr 1.23, Ca 9.5, Hgb 12.8, HCT 37.7, \par \par EKG 3/29/21: SR at 63 bpm, nonspecific ST-T wave abnormalities, MT interval 168 ms, QTc 419\par \par [employer] permanent pacemaker check implanted April 2013, dual-chamber A-pacing 36% V-pacing less than 1%, battery greater than 7 years, no episodes, follow-up 3 months\par \par Echocardiogram Oct 2020, LVEF 55-60%, normal bioprosthetic AVR, mild MR and TR, mild pulmonary hypertension\par \par Echocardiogram July 2019, LVEF 60%, normal bioprosthetic AVR, mild diastolic dysfunction, mild MR, normal RVSP\par  \par Lexascan Myoview stress July 2017, LVEF 54% diaphragm attenuation, no ischemia nonischemic EKG response, baseline sinus rhythm PRWP, NSST, no angina\par \par 2-D echo April 2017, LVEF 55%, function, normal bioprosthetic AVR, mild/moderate MR, normal aortic valve gradient, mild TR, PASP 35\par \par Carotid and abdominal ultrasound April 2017, nonobstructive, proximal aorta 2.8 cm.

## 2022-05-05 NOTE — PROCEDURE
[No] : not [NSR] : normal sinus rhythm [See Device Printout] : See device printout [Pacemaker] : pacemaker [DDD] : DDD [Threshold Testing Performed] : Threshold testing was performed [___ ms] : [unfilled] ms [None] : none [Counters Reset] : the counters were reset [Voltage: ___ volts] : Voltage was [unfilled] volts [Sensing Amplitude ___mv] : sensing amplitude was [unfilled] mv [Lead Imp:  ___ohms] : lead impedance was [unfilled] ohms [___V @] : [unfilled] V [de-identified] : RAZA  [de-identified] : [employer]  [de-identified] : [id number] [de-identified] : 4-24-[birth year] [de-identified] :  [de-identified] : 4.3-5 years [de-identified] : \par AP 44%\par  <1 %\par \par Mode Switch <1%\par \par 2 episodes of AT/AF. Longest 8 seconds. Rate controlled. Asymptomatic.\par \par Settings:\par RA: Amplitude 2.5V, pulse width 0.4 ms, sensitivity 0.1 mV\par RV: Amplitude 1.25 (auto), pulse width 0.4 ms, sensitivity 2mV\par \par \par He has history of this, however given prior history of syncopal fall and SDH, EP advised no AC unless prolonged AF. On ASA 81mg daily. Will continue to monitor. \par \par Patient has OV today.\par \par Pt declines remote monitoring. \par \par F/U: DVC in 3 months\par Discussed red flag symptoms, which would warrant sooner or emergent medical evaluation.\par Any questions and concerns were addressed and resolved.\par \par Sincerely,\par Kathleen Tariq FN-BC\par Patient's history, testing, and plan was reviewed with supervising physician, Dr. Patrick Valentino\par

## 2022-05-05 NOTE — HISTORY OF PRESENT ILLNESS
[FreeTextEntry1] : ROMA PULIDO is a 81 year old male with a past medical history of aortic stenosis, ascending aortic aneurysm, status post single-vessel CABG, bioprosthetic AVR, and ascending aortic repair at [facility] in April 2013, postop heart block, [employer] permanent pacemaker implant in April 2013 at [place], hypertension and dyslipidemia, Babisiosis, thrombocytopenia, multiple platelet transfusion, hematology Dr Agarwal, syncopal fall head trauma SDH admit [facility] August 2014, Prostate CA radiation.\par \par Last seen by 7/2021. Testing orderd. See detail erika.  Smokes 1 pack of cigarettes per month. Not on a formal exercise regimen. Able to climb 2 flights of stairs without CP or SOB.\par \par Testing:\par \par Echo 10/12/21: EF 60%. Mild MR. Bioprosthetic arotic valve. No AR. Severely dilated LA. Mild concentric LVH. Normal wall motion. \par \par Carotid 10/12/21: Mild nonobstrucitve carotid dz seen BL.\par \par ABd u/s 10/12/21: Ectatic abd aorta in prox position, mild plaque. Largest aortic diameter 2.8 cm.\par \par Nuke 10/19/21: Lexiscan. Atypical chest discomfort. Negative by EKG.  There is a medium sized, mild defect in lateral wall that is reversible, with normal motion and sig correction by prone images most likely suggestive of mild ischemia apical/lateral. The observed defects partially correct with prone imaging. EF 56%.\par \par Labs 8/10/21: PSA 0.4\par \par Labs 4/13/21: Na 141, K 4.2, Cr 1.23, Ca 9.5, Hgb 12.8, HCT 37.7, \par \par EKG 3/29/21: SR at 63 bpm, nonspecific ST-T wave abnormalities, NV interval 168 ms, QTc 419\par \par [employer] permanent pacemaker check implanted April 2013, dual-chamber A-pacing 36% V-pacing less than 1%, battery greater than 7 years, no episodes, follow-up 3 months\par \par Echocardiogram Oct 2020, LVEF 55-60%, normal bioprosthetic AVR, mild MR and TR, mild pulmonary hypertension\par \par Echocardiogram July 2019, LVEF 60%, normal bioprosthetic AVR, mild diastolic dysfunction, mild MR, normal RVSP\par  \par Lexascan Myoview stress July 2017, LVEF 54% diaphragm attenuation, no ischemia nonischemic EKG response, baseline sinus rhythm PRWP, NSST, no angina\par \par 2-D echo April 2017, LVEF 55%, function, normal bioprosthetic AVR, mild/moderate MR, normal aortic valve gradient, mild TR, PASP 35\par \par Carotid and abdominal ultrasound April 2017, nonobstructive, proximal aorta 2.8 cm.

## 2022-05-31 ENCOUNTER — APPOINTMENT (OUTPATIENT)
Dept: CARDIOLOGY | Facility: CLINIC | Age: 82
End: 2022-05-31
Payer: MEDICARE

## 2022-05-31 VITALS
WEIGHT: 177 LBS | OXYGEN SATURATION: 96 % | TEMPERATURE: 98.2 F | BODY MASS INDEX: 26.83 KG/M2 | HEART RATE: 60 BPM | DIASTOLIC BLOOD PRESSURE: 68 MMHG | SYSTOLIC BLOOD PRESSURE: 124 MMHG | HEIGHT: 68 IN

## 2022-05-31 PROCEDURE — 99215 OFFICE O/P EST HI 40 MIN: CPT

## 2022-05-31 NOTE — DISCUSSION/SUMMARY
[FreeTextEntry1] : Bill is a 81-year-old male with medical history detailed above and active medical issues including: \par \par - No angina symptoms and normal perfusion MPI stress test, normal LVEF July 2017.  In the setting of Covid 19 pandemic we will discuss the need for stress testing next office visit.\par \par - History of severe aortic stenosis, abnormal exercise stress echo, status post single-vessel CABG, bioprosthetic AVR, aortic root repair in April 2013.\par \par - Brief PAF CHADS VASc score 3, history of subdural hematoma, moderate risk for oral anticoagulation,  electrophysiologist Dr. Saad Reyes, no AC unless prolonged PAF.\par \par - Postop heart block, [employer] Kirit dual chamber permanent pacemaker implant in April 2013.\par \par - Hypertension at goal <130/80, follow up home BP daily over one week.\par \par - History of Babesiosis/thrombocytopenia following with hematology \par \par - Dyslipidemia intolerant to statins .\par \par - Prior tobacco abuse.\par \par Advised patient to follow active lifestyle with regular cardiovascular exercise. Patient educated on lifestyle and diet modification with low sodium low fat diet and avoidance of excessive alcohol. Patient is aware to call with any symptoms or concerns. \par \par Cardiology follow-up 6 months same-day echocardiogram and Doppler studies..  Current cardiac medications remain unchanged. Repeat labs will be ordered with PMD.\par \par Bill will follow up with Dr. Bennett for primary care. \par

## 2022-05-31 NOTE — PHYSICAL EXAM
[General Appearance - Well Developed] : well developed [Normal Appearance] : normal appearance [Well Groomed] : well groomed [General Appearance - Well Nourished] : well nourished [No Deformities] : no deformities [General Appearance - In No Acute Distress] : no acute distress [Eyelids - No Xanthelasma] : the eyelids demonstrated no xanthelasmas [Normal Oral Mucosa] : normal oral mucosa [No Oral Pallor] : no oral pallor [No Oral Cyanosis] : no oral cyanosis [Normal Jugular Venous A Waves Present] : normal jugular venous A waves present [Normal Jugular Venous V Waves Present] : normal jugular venous V waves present [No Jugular Venous Paiz A Waves] : no jugular venous paiz A waves [Respiration, Rhythm And Depth] : normal respiratory rhythm and effort [Exaggerated Use Of Accessory Muscles For Inspiration] : no accessory muscle use [Auscultation Breath Sounds / Voice Sounds] : lungs were clear to auscultation bilaterally [Heart Rate And Rhythm] : heart rate and rhythm were normal [Heart Sounds] : normal S1 and S2 [Systolic grade ___/6] : A grade [unfilled]/6 systolic murmur was heard. [Abdomen Soft] : soft [Abdomen Tenderness] : non-tender [Abdomen Mass (___ Cm)] : no abdominal mass palpated [Abnormal Walk] : normal gait [Gait - Sufficient For Exercise Testing] : the gait was sufficient for exercise testing [Nail Clubbing] : no clubbing of the fingernails [Cyanosis, Localized] : no localized cyanosis [Petechial Hemorrhages (___cm)] : no petechial hemorrhages [Skin Color & Pigmentation] : normal skin color and pigmentation [] : no rash [No Venous Stasis] : no venous stasis [Skin Lesions] : no skin lesions [No Skin Ulcers] : no skin ulcer [No Xanthoma] : no  xanthoma was observed [Oriented To Time, Place, And Person] : oriented to person, place, and time [Affect] : the affect was normal [Mood] : the mood was normal [No Anxiety] : not feeling anxious [Well Developed] : well developed [Well Nourished] : well nourished [No Acute Distress] : no acute distress [Normal Conjunctiva] : normal conjunctiva [Normal Venous Pressure] : normal venous pressure [No Carotid Bruit] : no carotid bruit [Normal S1, S2] : normal S1, S2 [No Rub] : no rub [No Gallop] : no gallop [Clear Lung Fields] : clear lung fields [Good Air Entry] : good air entry [No Respiratory Distress] : no respiratory distress  [Soft] : abdomen soft [Non Tender] : non-tender [No Masses/organomegaly] : no masses/organomegaly [Normal Bowel Sounds] : normal bowel sounds [Normal Gait] : normal gait [No Edema] : no edema [No Cyanosis] : no cyanosis [No Clubbing] : no clubbing [No Varicosities] : no varicosities [No Rash] : no rash [No Skin Lesions] : no skin lesions [Moves all extremities] : moves all extremities [No Focal Deficits] : no focal deficits [Normal Speech] : normal speech [Alert and Oriented] : alert and oriented [Normal memory] : normal memory [de-identified] : 2/6 KRYSTINA, nl AVR click [FreeTextEntry1] : 2/6 KRYSTINA, nl AVR click, right upper chest PPM

## 2022-05-31 NOTE — PROCEDURE
[No] : not [NSR] : normal sinus rhythm [Pacemaker] : pacemaker [DDD] : DDD [Voltage: ___ volts] : Voltage was [unfilled] volts [Lead Imp:  ___ohms] : lead impedance was [unfilled] ohms [Sensing Amplitude ___mv] : sensing amplitude was [unfilled] mv [___V @] : [unfilled] V [___ ms] : [unfilled] ms [None] : none [Threshold Testing Performed] : Threshold testing was not performed [de-identified] : RAZA  [de-identified] : [employer]  [de-identified] : [id number] [de-identified] : [birth year] [de-identified] : 62 [de-identified] : 8.2-9.3 years.  [de-identified] : AP 30%\par  <1 %\par 1 Episodes AF < 10s and one episode of Aflutter vs SVT <10 s\par \par He has history of this, however given prior history of syncopal fall and SDH, EP advised no AC unless prolonged AF. \par \par Continue 3 months follow up. \par

## 2022-05-31 NOTE — REASON FOR VISIT
[Follow-up Device Check] : follow-up device check visit [Other: ____] : [unfilled] [FreeTextEntry1] : Bill is a 81-year-old male with history of aortic stenosis, ascending aortic aneurysm, status post single-vessel CABG, bioprosthetic AVR, and ascending aortic repair at [facility] in April 2013, postop heart block, [employer] permanent pacemaker implant in April 2013 at [place], hypertension and dyslipidemia, Babisiosis, thrombocytopenia, multiple platelet transfusion, hematology Dr Agarwal, syncopal fall head trauma SDH admit [facility] August 2014, Prostate CA radiation therapy\par \par Cardiovascular review of symptoms is negative for exertional chest pain, dyspnea, palpitations, dizziness or syncope. No PND or orthopnea leg edema. No bleeding or black stool.\par \par Patient is walking 20 minutes routine basis without exertional symptoms. He is compliant with his diet medications.\par \par Patient is walking 15 minutes without exertional chest pain.\par \par [employer] permanent pacemaker check  implanted April 2013, dual-chamber A-pacing 36% V-pacing less than 1%, battery greater than 7 years, no episodes, follow-up 3 months\par \par Echocardiogram Oct 2020, LVEF 55-60%, normal bioprosthetic AVR, mild MR and TR, mild pulmonary hypertension\par \par Echocardiogram July 2019, LVEF 60%, normal bioprosthetic AVR, mild diastolic dysfunction, mild MR, normal RVSP\par  \par Lexascan Myoview stress July 2017, LVEF 54% diaphragm attenuation, no ischemia nonischemic EKG response, baseline sinus rhythm PRWP, NSST, no angina\par \par 2-D echo April 2017, LVEF 55%, function, normal bioprosthetic AVR, mild/moderate MR, normal aortic valve gradient, mild TR, PASP 35\par \par Carotid and abdominal ultrasound April 2017, nonobstructive, proximal aorta 2.8 cm.\par  \par

## 2022-08-22 ENCOUNTER — APPOINTMENT (OUTPATIENT)
Dept: CARDIOLOGY | Facility: CLINIC | Age: 82
End: 2022-08-22

## 2022-08-22 VITALS
DIASTOLIC BLOOD PRESSURE: 78 MMHG | SYSTOLIC BLOOD PRESSURE: 138 MMHG | BODY MASS INDEX: 25.46 KG/M2 | HEART RATE: 60 BPM | OXYGEN SATURATION: 98 % | WEIGHT: 168 LBS | HEIGHT: 68 IN | TEMPERATURE: 97.5 F

## 2022-08-22 PROCEDURE — 93280 PM DEVICE PROGR EVAL DUAL: CPT

## 2022-08-22 NOTE — PROCEDURE
[No] : not [NSR] : normal sinus rhythm [See Device Printout] : See device printout [Pacemaker] : pacemaker [DDD] : DDD [Threshold Testing Performed] : Threshold testing was performed [___ ms] : [unfilled] ms [None] : none [Counters Reset] : the counters were reset [Voltage: ___ volts] : Voltage was [unfilled] volts [Sensing Amplitude ___mv] : sensing amplitude was [unfilled] mv [Lead Imp:  ___ohms] : lead impedance was [unfilled] ohms [___V @] : [unfilled] V [de-identified] : RAZA  [de-identified] : [employer]  [de-identified] : [id number] [de-identified] : [birth year]24-[birth year] [de-identified] :  [de-identified] : 3.9 - 4.4 years [de-identified] : \par AP 45%\par  <1 %\par \par Mode Switch <1%\par \par 1 episodes of AT/AF with RVR. Longest 6 seconds. Asymptomatic.\par \par Settings:\par RA: Amplitude 2.5V, pulse width 0.4 ms, sensitivity 0.1 mV\par RV: Amplitude 1.25 (auto), pulse width 0.4 ms, sensitivity 2mV\par \par \par He has history of this, however given prior history of syncopal fall and SDH, EP advised no AC unless prolonged AF. On ASA 81mg daily. Will continue to monitor. \par \par \par \par Pt declines remote monitoring. \par \par F/U: DVC in 3 months and OV same day.\par Discussed red flag symptoms, which would warrant sooner or emergent medical evaluation.\par Any questions and concerns were addressed and resolved.\par \par Sincerely,\par Kathleen Tariq [facility]-BC\par Patient's history, testing, and plan was reviewed with supervising physician, Dr. Kelly Corea\par

## 2022-09-15 ENCOUNTER — FORM ENCOUNTER (OUTPATIENT)
Age: 82
End: 2022-09-15

## 2022-09-19 ENCOUNTER — APPOINTMENT (OUTPATIENT)
Dept: ORTHOPEDIC SURGERY | Facility: CLINIC | Age: 82
End: 2022-09-19

## 2022-09-19 VITALS — HEIGHT: 68 IN | WEIGHT: 168 LBS | BODY MASS INDEX: 25.46 KG/M2

## 2022-09-19 PROCEDURE — 72100 X-RAY EXAM L-S SPINE 2/3 VWS: CPT

## 2022-09-19 PROCEDURE — 99204 OFFICE O/P NEW MOD 45 MIN: CPT

## 2022-09-26 NOTE — ASSESSMENT
[FreeTextEntry1] : Patient given prescription for CT, follow up after study is completed to discuss results. \par \par Recommend: - NSAID - Heating pad - Muscle relaxer - Core strengthening exercise - Hamstring stretching exercise Patient is given back rehabilitation exercise book. \par \par Patient will begin physical therapy.

## 2022-09-26 NOTE — HISTORY OF PRESENT ILLNESS
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 Previously had an injection, unsure what kind. Experiencing localized pain. Taking Tylenol. Denies recent imaging. [] : no

## 2022-09-26 NOTE — REASON FOR VISIT
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

## 2022-09-26 NOTE — ADDENDUM
[FreeTextEntry1] : Patient reports the incident of being "struck with a piece of plywood while at work" occurred ~50 years ago. He reports having a Workers Compensation claim in the past for his head and shoulder, but does not feel his back was injured at that time. He has since retired and is not seeking workers compensation.

## 2022-11-01 ENCOUNTER — FORM ENCOUNTER (OUTPATIENT)
Age: 82
End: 2022-11-01

## 2022-11-07 ENCOUNTER — OFFICE (OUTPATIENT)
Dept: URBAN - METROPOLITAN AREA CLINIC 97 | Facility: CLINIC | Age: 82
Setting detail: OPHTHALMOLOGY
End: 2022-11-07
Payer: COMMERCIAL

## 2022-11-07 DIAGNOSIS — Z96.1: ICD-10-CM

## 2022-11-07 PROCEDURE — 99024 POSTOP FOLLOW-UP VISIT: CPT | Performed by: OPHTHALMOLOGY

## 2022-11-07 ASSESSMENT — LID POSITION - DERMATOCHALASIS
OS_DERMATOCHALASIS: LLL LUL 2+
OD_DERMATOCHALASIS: RLL RUL 2+

## 2022-11-07 ASSESSMENT — CONFRONTATIONAL VISUAL FIELD TEST (CVF)
OS_FINDINGS: FULL
OD_FINDINGS: FULL

## 2022-11-07 ASSESSMENT — SUPERFICIAL PUNCTATE KERATITIS (SPK)
OD_SPK: T
OS_SPK: T

## 2022-11-07 ASSESSMENT — REFRACTION_CURRENTRX
OD_AXIS: 009
OS_OVR_VA: 20/
OS_OVR_VA: 20/
OD_VPRISM_DIRECTION: SV
OD_VPRISM_DIRECTION: SV
OS_VPRISM_DIRECTION: SV
OD_AXIS: 180
OS_CYLINDER: +1.00
OS_VPRISM_DIRECTION: SV
OD_OVR_VA: 20/
OD_OVR_VA: 20/
OS_SPHERE: +0.75
OD_CYLINDER: +1.50
OS_AXIS: 180
OD_SPHERE: +2.75
OD_SPHERE: -0.50
OS_AXIS: 174
OS_SPHERE: +3.75
OD_CYLINDER: +1.75
OS_CYLINDER: +1.00

## 2022-11-07 ASSESSMENT — REFRACTION_MANIFEST
OD_VA2: 20/30(J2)
OS_AXIS: 180
OS_SPHERE: PLANO
OU_VA: 20/50-
OS_CYLINDER: +1.00
OS_ADD: +3.00
OD_VA1: 20/60+2
OD_ADD: +3.00
OS_AXIS: 140
OD_VA1: 20/50-
OD_VA2: 20/30(J2)
OD_SPHERE: PLANO
OD_SPHERE: -1.00
OU_VA: 20/40-2
OD_CYLINDER: +2.50
OS_CYLINDER: +0.75
OS_VA1: 20/30
OD_ADD: +3.00
OS_VA2: 20/30(J2)
OS_SPHERE: +0.25
OS_ADD: +3.00
OD_AXIS: 010
OS_VA1: 20/50-2
OD_CYLINDER: SPH
OS_VA2: 20/30(J2)

## 2022-11-07 ASSESSMENT — SPHEQUIV_DERIVED
OD_SPHEQUIV: 1
OD_SPHEQUIV: 0.25
OS_SPHEQUIV: 0.625

## 2022-11-07 ASSESSMENT — KERATOMETRY
OD_AXISANGLE_DEGREES: 008
OS_AXISANGLE_DEGREES: 149
METHOD_AUTO_MANUAL: AUTO
OS_K2POWER_DIOPTERS: 43.75
OS_K1POWER_DIOPTERS: 42.50
OD_K2POWER_DIOPTERS: 44.00
OD_K1POWER_DIOPTERS: 43.25

## 2022-11-07 ASSESSMENT — TONOMETRY
OD_IOP_MMHG: 10
OS_IOP_MMHG: 14

## 2022-11-07 ASSESSMENT — VISUAL ACUITY
OS_BCVA: 20/70
OD_BCVA: 20/40-

## 2022-11-07 ASSESSMENT — PACHYMETRY
OS_CT_CORRECTION: 1
OS_CT_UM: 526

## 2022-11-07 ASSESSMENT — AXIALLENGTH_DERIVED
OD_AL: 23.1643
OD_AL: 23.4495
OS_AL: 23.4866

## 2022-11-07 ASSESSMENT — LID EXAM ASSESSMENTS: OS_TRICHIASIS: LLL 1+

## 2022-11-07 ASSESSMENT — REFRACTION_AUTOREFRACTION
OS_SPHERE: PLANO
OD_SPHERE: +0.50
OS_AXIS: 140
OS_CYLINDER: +1.00
OD_AXIS: 017
OD_CYLINDER: +1.00

## 2022-11-07 ASSESSMENT — CORNEAL EDEMA CLINICAL DESCRIPTION: OS_CORNEALEDEMA: 1+

## 2022-11-14 ENCOUNTER — APPOINTMENT (OUTPATIENT)
Dept: ORTHOPEDIC SURGERY | Facility: CLINIC | Age: 82
End: 2022-11-14
Payer: MEDICARE

## 2022-11-14 VITALS — WEIGHT: 168 LBS | BODY MASS INDEX: 25.46 KG/M2 | HEIGHT: 68 IN

## 2022-11-14 PROCEDURE — 99214 OFFICE O/P EST MOD 30 MIN: CPT

## 2022-11-14 NOTE — HISTORY OF PRESENT ILLNESS
[Lower back] : lower back [Gradual] : gradual [7] : 7 [6] : 6 [Constant] : constant [Nothing helps with pain getting better] : Nothing helps with pain getting better [Sitting] : sitting [Retired] : Work status: retired [de-identified] : Follow up lumbar spine. Denies having cervical spine. Experiencing constant pain. Taking Tylenol with no relief. [] : no

## 2022-11-14 NOTE — ASSESSMENT
[FreeTextEntry1] : Patient given prescription for CT, follow up after study is completed to discuss results. \par \par Recommend: - NSAID - Heating pad - Muscle relaxer - Core strengthening exercise - Hamstring stretching exercise Patient is given back rehabilitation exercise book. \par \par Continue PT.

## 2022-11-20 ENCOUNTER — FORM ENCOUNTER (OUTPATIENT)
Age: 82
End: 2022-11-20

## 2022-11-28 ENCOUNTER — OFFICE (OUTPATIENT)
Dept: URBAN - METROPOLITAN AREA CLINIC 8 | Facility: CLINIC | Age: 82
Setting detail: OPHTHALMOLOGY
End: 2022-11-28
Payer: COMMERCIAL

## 2022-11-28 DIAGNOSIS — Z96.1: ICD-10-CM

## 2022-11-28 PROCEDURE — 99024 POSTOP FOLLOW-UP VISIT: CPT | Performed by: OPHTHALMOLOGY

## 2022-11-28 ASSESSMENT — REFRACTION_CURRENTRX
OS_CYLINDER: +1.00
OD_OVR_VA: 20/
OS_VPRISM_DIRECTION: SV
OD_VPRISM_DIRECTION: SV
OD_VPRISM_DIRECTION: SV
OD_OVR_VA: 20/
OD_AXIS: 180
OS_CYLINDER: +1.00
OS_AXIS: 180
OD_CYLINDER: +1.50
OD_SPHERE: +2.75
OD_SPHERE: +2.50
OS_OVR_VA: 20/
OD_CYLINDER: +1.75
OS_OVR_VA: 20/
OD_AXIS: 009
OS_SPHERE: +2.50
OD_SPHERE: -0.50
OS_SPHERE: +3.75
OS_SPHERE: +0.75
OS_OVR_VA: 20/
OS_VPRISM_DIRECTION: SV
OS_VPRISM_DIRECTION: SV
OS_AXIS: 174
OD_OVR_VA: 20/
OD_VPRISM_DIRECTION: SV

## 2022-11-28 ASSESSMENT — REFRACTION_AUTOREFRACTION
OS_CYLINDER: -1.00
OD_SPHERE: +1.25
OD_CYLINDER: -0.75
OS_AXIS: 065
OS_SPHERE: +1.25
OD_AXIS: 096

## 2022-11-28 ASSESSMENT — CONFRONTATIONAL VISUAL FIELD TEST (CVF)
OD_FINDINGS: FULL
OS_FINDINGS: FULL

## 2022-11-28 ASSESSMENT — REFRACTION_MANIFEST
OS_SPHERE: +0.25
OD_AXIS: 100
OS_ADD: +3.00
OD_CYLINDER: -0.75
OS_SPHERE: +1.00
OD_CYLINDER: +2.50
OD_ADD: +3.00
OS_VA2: 20/30(J2)
OD_VA1: 20/60
OS_VA2: 20/30(J2)
OS_CYLINDER: -0.75
OU_VA: 20/50-1
OS_AXIS: 180
OS_VA1: 20/50-2
OS_CYLINDER: +0.75
OD_VA2: 20/30(J2)
OD_VA2: 20/30(J2)
OD_ADD: +3.00
OD_SPHERE: -1.00
OD_SPHERE: +1.00
OD_VA1: 20/60+2
OS_ADD: +3.00
OD_AXIS: 010
OS_AXIS: 065
OS_VA1: 20/NI
OU_VA: 20/50-

## 2022-11-28 ASSESSMENT — LID POSITION - DERMATOCHALASIS
OD_DERMATOCHALASIS: RLL RUL 2+
OS_DERMATOCHALASIS: LLL LUL 2+

## 2022-11-28 ASSESSMENT — KERATOMETRY
OS_AXISANGLE_DEGREES: 153
OD_K2POWER_DIOPTERS: 44.25
OD_K1POWER_DIOPTERS: 43.50
OS_K1POWER_DIOPTERS: 43.00
OS_K2POWER_DIOPTERS: 43.50
METHOD_AUTO_MANUAL: AUTO
OD_AXISANGLE_DEGREES: 009

## 2022-11-28 ASSESSMENT — AXIALLENGTH_DERIVED
OD_AL: 23.3591
OS_AL: 23.3932
OS_AL: 23.4412
OD_AL: 23.2167
OS_AL: 23.4412
OD_AL: 23.1228

## 2022-11-28 ASSESSMENT — SPHEQUIV_DERIVED
OS_SPHEQUIV: 0.75
OD_SPHEQUIV: 0.25
OD_SPHEQUIV: 0.625
OS_SPHEQUIV: 0.625
OD_SPHEQUIV: 0.875
OS_SPHEQUIV: 0.625

## 2022-11-28 ASSESSMENT — SUPERFICIAL PUNCTATE KERATITIS (SPK)
OS_SPK: T
OD_SPK: T

## 2022-11-28 ASSESSMENT — TONOMETRY
OD_IOP_MMHG: 14
OS_IOP_MMHG: 14

## 2022-11-28 ASSESSMENT — VISUAL ACUITY
OS_BCVA: 20/70
OD_BCVA: 20/60

## 2022-11-28 ASSESSMENT — PACHYMETRY
OS_CT_UM: 526
OS_CT_CORRECTION: 1

## 2022-11-30 ENCOUNTER — APPOINTMENT (OUTPATIENT)
Dept: CARDIOLOGY | Facility: CLINIC | Age: 82
End: 2022-11-30

## 2022-11-30 PROCEDURE — 93880 EXTRACRANIAL BILAT STUDY: CPT

## 2022-11-30 PROCEDURE — 93306 TTE W/DOPPLER COMPLETE: CPT

## 2022-11-30 PROCEDURE — 93979 VASCULAR STUDY: CPT

## 2022-11-30 NOTE — REASON FOR VISIT
[Other: ____] : [unfilled] [FreeTextEntry1] : Bill is a 81-year-old male with history of aortic stenosis, ascending aortic aneurysm, status post single-vessel CABG, bioprosthetic AVR, and ascending aortic repair at [facility] in April 2013, postop heart block, [employer] permanent pacemaker implant in April 2013 at [place], hypertension and dyslipidemia, Babisiosis, thrombocytopenia, multiple platelet transfusion, hematology Dr Agarwal, syncopal fall head trauma SDH admit [facility] August 2014, Prostate CA radiation therapy\par \par Cardiovascular review of symptoms is negative for exertional chest pain, dyspnea, palpitations, dizziness or syncope. No PND or orthopnea leg edema. No bleeding or black stool.\par \par Patient is walking 20 minutes routine basis without exertional symptoms. He is compliant with his diet medications.\par \par Patient is walking 15 minutes without exertional chest pain.\par \par [employer] permanent pacemaker check  implanted April 2013, dual-chamber A-pacing 36% V-pacing less than 1%, battery greater than 7 years, no episodes, follow-up 3 months\par \par Echocardiogram Oct 2020, LVEF 55-60%, normal bioprosthetic AVR, mild MR and TR, mild pulmonary hypertension\par \par Echocardiogram July 2019, LVEF 60%, normal bioprosthetic AVR, mild diastolic dysfunction, mild MR, normal RVSP\par  \par Lexascan Myoview stress July 2017, LVEF 54% diaphragm attenuation, no ischemia nonischemic EKG response, baseline sinus rhythm PRWP, NSST, no angina\par \par 2-D echo April 2017, LVEF 55%, function, normal bioprosthetic AVR, mild/moderate MR, normal aortic valve gradient, mild TR, PASP 35\par \par Carotid and abdominal ultrasound April 2017, nonobstructive, proximal aorta 2.8 cm.\par  \par  [Follow-up Device Check] : follow-up device check visit

## 2022-11-30 NOTE — PHYSICAL EXAM
[Well Developed] : well developed [Well Nourished] : well nourished [No Acute Distress] : no acute distress [Normal Venous Pressure] : normal venous pressure [No Carotid Bruit] : no carotid bruit [Normal S1, S2] : normal S1, S2 [No Rub] : no rub [No Gallop] : no gallop [Clear Lung Fields] : clear lung fields [Good Air Entry] : good air entry [No Respiratory Distress] : no respiratory distress  [Soft] : abdomen soft [Non Tender] : non-tender [No Masses/organomegaly] : no masses/organomegaly [Normal Bowel Sounds] : normal bowel sounds [Normal Gait] : normal gait [No Edema] : no edema [No Cyanosis] : no cyanosis [No Clubbing] : no clubbing [No Varicosities] : no varicosities [No Rash] : no rash [No Skin Lesions] : no skin lesions [Moves all extremities] : moves all extremities [No Focal Deficits] : no focal deficits [Normal Speech] : normal speech [Alert and Oriented] : alert and oriented [Normal memory] : normal memory [de-identified] : 2/6 KRYSTINA, nl AVR click [General Appearance - Well Developed] : well developed [Normal Appearance] : normal appearance [Well Groomed] : well groomed [General Appearance - Well Nourished] : well nourished [No Deformities] : no deformities [General Appearance - In No Acute Distress] : no acute distress [Normal Conjunctiva] : the conjunctiva exhibited no abnormalities [Eyelids - No Xanthelasma] : the eyelids demonstrated no xanthelasmas [Normal Oral Mucosa] : normal oral mucosa [No Oral Pallor] : no oral pallor [No Oral Cyanosis] : no oral cyanosis [Normal Jugular Venous A Waves Present] : normal jugular venous A waves present [Normal Jugular Venous V Waves Present] : normal jugular venous V waves present [No Jugular Venous Paiz A Waves] : no jugular venous paiz A waves [Respiration, Rhythm And Depth] : normal respiratory rhythm and effort [Exaggerated Use Of Accessory Muscles For Inspiration] : no accessory muscle use [Auscultation Breath Sounds / Voice Sounds] : lungs were clear to auscultation bilaterally [Heart Rate And Rhythm] : heart rate and rhythm were normal [Heart Sounds] : normal S1 and S2 [Systolic grade ___/6] : A grade [unfilled]/6 systolic murmur was heard. [FreeTextEntry1] : 2/6 KRYSTINA, nl AVR click, right upper chest PPM [Abdomen Soft] : soft [Abdomen Tenderness] : non-tender [Abdomen Mass (___ Cm)] : no abdominal mass palpated [Abnormal Walk] : normal gait [Gait - Sufficient For Exercise Testing] : the gait was sufficient for exercise testing [Nail Clubbing] : no clubbing of the fingernails [Cyanosis, Localized] : no localized cyanosis [Petechial Hemorrhages (___cm)] : no petechial hemorrhages [Skin Color & Pigmentation] : normal skin color and pigmentation [] : no rash [No Venous Stasis] : no venous stasis [Skin Lesions] : no skin lesions [No Skin Ulcers] : no skin ulcer [No Xanthoma] : no  xanthoma was observed [Oriented To Time, Place, And Person] : oriented to person, place, and time [Affect] : the affect was normal [Mood] : the mood was normal [No Anxiety] : not feeling anxious

## 2022-11-30 NOTE — PROCEDURE
[No] : not [NSR] : normal sinus rhythm [Pacemaker] : pacemaker [DDD] : DDD [Voltage: ___ volts] : Voltage was [unfilled] volts [Threshold Testing Performed] : Threshold testing was not performed [Lead Imp:  ___ohms] : lead impedance was [unfilled] ohms [Sensing Amplitude ___mv] : sensing amplitude was [unfilled] mv [___V @] : [unfilled] V [___ ms] : [unfilled] ms [None] : none [de-identified] : RAZA  [de-identified] : [employer]  [de-identified] : [id number] [de-identified] : [birth year] [de-identified] : 62 [de-identified] : 8.2-9.3 years.  [de-identified] : AP 30%\par  <1 %\par 1 Episodes AF < 10s and one episode of Aflutter vs SVT <10 s\par \par He has history of this, however given prior history of syncopal fall and SDH, EP advised no AC unless prolonged AF. \par \par Continue 3 months follow up. \par

## 2022-12-06 ENCOUNTER — APPOINTMENT (OUTPATIENT)
Dept: CARDIOLOGY | Facility: CLINIC | Age: 82
End: 2022-12-06

## 2022-12-08 PROBLEM — Z95.3 PRESENCE OF XENOGENIC HEART VALVE: Status: ACTIVE | Noted: 2017-11-02

## 2022-12-08 NOTE — PROCEDURE
[No] : not [NSR] : normal sinus rhythm [Pacemaker] : pacemaker [DDD] : DDD [Voltage: ___ volts] : Voltage was [unfilled] volts [Threshold Testing Performed] : Threshold testing was not performed [Lead Imp:  ___ohms] : lead impedance was [unfilled] ohms [Sensing Amplitude ___mv] : sensing amplitude was [unfilled] mv [___V @] : [unfilled] V [___ ms] : [unfilled] ms [None] : none [de-identified] : RAZA  [de-identified] : [employer]  [de-identified] : [id number] [de-identified] : 4-24-13 [de-identified] : 60 [de-identified] : 8.2-9.3 years.  [de-identified] : AP 30%\par  <1 %\par 1 Episodes AF < 10s and one episode of Aflutter vs SVT <10 s\par \par He has history of this, however given prior history of syncopal fall and SDH, EP advised no AC unless prolonged AF. \par \par Continue 3 months follow up. \par

## 2022-12-08 NOTE — REASON FOR VISIT
[Other: ____] : [unfilled] [FreeTextEntry1] : Bill is a 81-year-old male with history of aortic stenosis, ascending aortic aneurysm, status post single-vessel CABG, bioprosthetic AVR, and ascending aortic repair at [facility] in April 2013, postop heart block, [employer]irit permanent pacemaker implant in April 2013 at [place], hypertension and dyslipidemia, Babisiosis, thrombocytopenia, multiple platelet transfusion, hematology Dr Agarwal, syncopal fall head trauma SDH admit [facility] August 2014, Prostate CA radiation therapy\par \par Cardiovascular review of symptoms is negative for exertional chest pain, dyspnea, palpitations, dizziness or syncope. No PND or orthopnea leg edema. No bleeding or black stool.\par \par Patient is walking 20 minutes routine basis without exertional symptoms. He is compliant with his diet medications.\par \par Patient is walking 15 minutes without exertional chest pain.\par \par [employer] permanent pacemaker check  implanted April 2013, dual-chamber A-pacing 36% V-pacing less than 1%, battery greater than 7 years, no episodes, follow-up 3 months\par \par Echocardiogram Oct 2020, LVEF 55-60%, normal bioprosthetic AVR, mild MR and TR, mild pulmonary hypertension\par \par Echocardiogram July 2019, LVEF 60%, normal bioprosthetic AVR, mild diastolic dysfunction, mild MR, normal RVSP\par  \par Lexascan Myoview stress July 2017, LVEF 54% diaphragm attenuation, no ischemia nonischemic EKG response, baseline sinus rhythm PRWP, NSST, no angina\par \par 2-D echo April 2017, LVEF 55%, function, normal bioprosthetic AVR, mild/moderate MR, normal aortic valve gradient, mild TR, PASP 35\par \par Carotid and abdominal ultrasound April 2017, nonobstructive, proximal aorta 2.8 cm.\par  \par  [Follow-up Device Check] : follow-up device check visit

## 2022-12-08 NOTE — PHYSICAL EXAM
[Well Developed] : well developed [Well Nourished] : well nourished [No Acute Distress] : no acute distress [Normal Venous Pressure] : normal venous pressure [No Carotid Bruit] : no carotid bruit [Normal S1, S2] : normal S1, S2 [No Rub] : no rub [No Gallop] : no gallop [Clear Lung Fields] : clear lung fields [Good Air Entry] : good air entry [No Respiratory Distress] : no respiratory distress  [Soft] : abdomen soft [Non Tender] : non-tender [No Masses/organomegaly] : no masses/organomegaly [Normal Bowel Sounds] : normal bowel sounds [Normal Gait] : normal gait [No Edema] : no edema [No Cyanosis] : no cyanosis [No Clubbing] : no clubbing [No Varicosities] : no varicosities [No Rash] : no rash [No Skin Lesions] : no skin lesions [Moves all extremities] : moves all extremities [No Focal Deficits] : no focal deficits [Normal Speech] : normal speech [Alert and Oriented] : alert and oriented [Normal memory] : normal memory [de-identified] : 2/6 KRYSTINA, nl AVR click [General Appearance - Well Developed] : well developed [Normal Appearance] : normal appearance [Well Groomed] : well groomed [General Appearance - Well Nourished] : well nourished [No Deformities] : no deformities [General Appearance - In No Acute Distress] : no acute distress [Normal Conjunctiva] : the conjunctiva exhibited no abnormalities [Eyelids - No Xanthelasma] : the eyelids demonstrated no xanthelasmas [Normal Oral Mucosa] : normal oral mucosa [No Oral Pallor] : no oral pallor [No Oral Cyanosis] : no oral cyanosis [Normal Jugular Venous A Waves Present] : normal jugular venous A waves present [Normal Jugular Venous V Waves Present] : normal jugular venous V waves present [No Jugular Venous Paiz A Waves] : no jugular venous paiz A waves [Respiration, Rhythm And Depth] : normal respiratory rhythm and effort [Exaggerated Use Of Accessory Muscles For Inspiration] : no accessory muscle use [Auscultation Breath Sounds / Voice Sounds] : lungs were clear to auscultation bilaterally [Heart Rate And Rhythm] : heart rate and rhythm were normal [Heart Sounds] : normal S1 and S2 [Systolic grade ___/6] : A grade [unfilled]/6 systolic murmur was heard. [FreeTextEntry1] : 2/6 KRYSTINA, nl AVR click, right upper chest PPM [Abdomen Soft] : soft [Abdomen Tenderness] : non-tender [Abdomen Mass (___ Cm)] : no abdominal mass palpated [Abnormal Walk] : normal gait [Gait - Sufficient For Exercise Testing] : the gait was sufficient for exercise testing [Nail Clubbing] : no clubbing of the fingernails [Cyanosis, Localized] : no localized cyanosis [Petechial Hemorrhages (___cm)] : no petechial hemorrhages [Skin Color & Pigmentation] : normal skin color and pigmentation [] : no rash [No Venous Stasis] : no venous stasis [Skin Lesions] : no skin lesions [No Skin Ulcers] : no skin ulcer [No Xanthoma] : no  xanthoma was observed [Oriented To Time, Place, And Person] : oriented to person, place, and time [Affect] : the affect was normal [Mood] : the mood was normal [No Anxiety] : not feeling anxious

## 2022-12-13 ENCOUNTER — APPOINTMENT (OUTPATIENT)
Dept: CARDIOLOGY | Facility: CLINIC | Age: 82
End: 2022-12-13

## 2022-12-13 VITALS
TEMPERATURE: 97.1 F | BODY MASS INDEX: 25.01 KG/M2 | HEIGHT: 68 IN | OXYGEN SATURATION: 96 % | HEART RATE: 70 BPM | WEIGHT: 165 LBS

## 2022-12-13 VITALS — SYSTOLIC BLOOD PRESSURE: 138 MMHG | DIASTOLIC BLOOD PRESSURE: 64 MMHG

## 2022-12-13 DIAGNOSIS — I71.20 THORACIC AORTIC ANEURYSM, WITHOUT RUPTURE, UNSPECIFIED: ICD-10-CM

## 2022-12-13 DIAGNOSIS — E78.00 PURE HYPERCHOLESTEROLEMIA, UNSPECIFIED: ICD-10-CM

## 2022-12-13 DIAGNOSIS — Z95.3 PRESENCE OF XENOGENIC HEART VALVE: ICD-10-CM

## 2022-12-13 PROCEDURE — 93280 PM DEVICE PROGR EVAL DUAL: CPT

## 2022-12-13 PROCEDURE — 99215 OFFICE O/P EST HI 40 MIN: CPT | Mod: 25

## 2022-12-13 NOTE — PHYSICAL EXAM
[Well Developed] : well developed [Well Nourished] : well nourished [No Acute Distress] : no acute distress [Normal Conjunctiva] : normal conjunctiva [Normal Venous Pressure] : normal venous pressure [No Carotid Bruit] : no carotid bruit [No Rub] : no rub [No Gallop] : no gallop [Clear Lung Fields] : clear lung fields [Good Air Entry] : good air entry [No Respiratory Distress] : no respiratory distress  [Soft] : abdomen soft [Non Tender] : non-tender [No Masses/organomegaly] : no masses/organomegaly [Normal Bowel Sounds] : normal bowel sounds [Normal Gait] : normal gait [No Edema] : no edema [No Cyanosis] : no cyanosis [No Clubbing] : no clubbing [No Varicosities] : no varicosities [No Rash] : no rash [No Skin Lesions] : no skin lesions [Moves all extremities] : moves all extremities [No Focal Deficits] : no focal deficits [Normal Speech] : normal speech [Alert and Oriented] : alert and oriented [Normal memory] : normal memory [Normal S1, S2] : normal S1, S2

## 2022-12-13 NOTE — DISCUSSION/SUMMARY
[FreeTextEntry1] : ROMA PULIDO is a 82 year old M who presents today Dec 13, 2022 with the above history and the following active issues:\par \par ?Atypical CP. dizziness. falls.\par Obtain a pharm nuclear stress test to evaluate for evidence of myocardial ischemia. The patient will not be able to walk on a treadmill.\par \par Claudication: BLLE arterial u/s ordered.\par \par History of severe aortic stenosis, abnormal exercise stress echo, status post single-vessel CABG, bioprosthetic AVR, aortic root repair in April 2013.\par \par On ASA, statin, beta blocker. Continue Losartan.\par \par Brief PAF, history of subdural hematoma, moderate risk for oral anticoagulation, electrophysiologist Dr. Saad Reyes, no AC unless prolonged PAF. 6 second episode and 2 minutes of AT/AF noted on DVC today. Will have patient seen EP. AMS burden <1%. Note battery longevity.\par \par Postop heart block, [employer]irit dual chamber permanent pacemaker implant in April 2013.\par \par See device note for more details.\par \par Hypertension: Continue Losartan and Toprol.\par \par History of Babesiosis/thrombocytopenia following with hematology \par \par Dyslipidemia on Pravastatin\par \par Smoking cessation discussed.\par \par Patient will have nuclear stress test and BLLE arterial u/s and f/u visit to review results. He will also see Dr. Lynn. See device note.\par \par Ongoing f/u with PCP.\par \par F/U as above..\par Discussed red flag symptoms, which would warrant sooner or emergent medical evaluation.\par Any questions and concerns were addressed and resolved.\par \par Sincerely,\par Kathleen Tariq [facility]\par Patient's history, testing, and plan was reviewed with supervising physician, Dr. Patrick Valentino\par

## 2022-12-13 NOTE — DISCUSSION/SUMMARY
[FreeTextEntry1] : ROMA PULIDO is a 82 year old M who presents today Dec 13, 2022 with the above history and the following active issues:\par \par ?Atypical CP. dizziness. falls.\par Obtain a pharm nuclear stress test to evaluate for evidence of myocardial ischemia. The patient will not be able to walk on a treadmill.\par \par Claudication: BLLE arterial u/s ordered.\par \par History of severe aortic stenosis, abnormal exercise stress echo, status post single-vessel CABG, bioprosthetic AVR, aortic root repair in April 2013.\par \par On ASA, statin, beta blocker. Continue Losartan.\par \par Brief PAF, history of subdural hematoma, moderate risk for oral anticoagulation, electrophysiologist Dr. Saad Reyes, no AC unless prolonged PAF. 6 second episode and 2 minutes of AT/AF noted on DVC today. Will have patient seen EP. AMS burden <1%. Note battery longevity.\par \par Postop heart block, [employer] Kirit dual chamber permanent pacemaker implant in April 2013.\par \par See device note for more details.\par \par Hypertension: Continue Losartan and Toprol.\par \par History of Babesiosis/thrombocytopenia following with hematology \par \par Dyslipidemia on Pravastatin\par \par Smoking cessation discussed.\par \par Patient will have nuclear stress test and BLLE arterial u/s and f/u visit to review results. He will also see Dr. Lynn. See device note.\par \par Ongoing f/u with PCP.\par \par F/U as above..\par Discussed red flag symptoms, which would warrant sooner or emergent medical evaluation.\par Any questions and concerns were addressed and resolved.\par \par Sincerely,\par Kathleen Tariq [facility]\par Patient's history, testing, and plan was reviewed with supervising physician, Dr. Patrick Valentino\par

## 2022-12-13 NOTE — PROCEDURE
[No] : not [NSR] : normal sinus rhythm [See Device Printout] : See device printout [Pacemaker] : pacemaker [DDD] : DDD [Threshold Testing Performed] : Threshold testing was performed [___ ms] : [unfilled] ms [None] : none [Counters Reset] : the counters were reset [Voltage: ___ volts] : Voltage was [unfilled] volts [___V @] : [unfilled] V [Lead Imp:  ___ohms] : lead impedance was [unfilled] ohms [Sensing Amplitude ___mv] : sensing amplitude was [unfilled] mv [de-identified] : RAZA  [de-identified] : [employer]  [de-identified] : [id number] [de-identified] : 4-24-13 [de-identified] :  [de-identified] : Battery longevity 1.5 years [de-identified] : \par AP 46%\par  <1 %\par \par Mode Switch <1%\par \par 2 episodes of AT/AF with RVR. Longest approx 2 minutes, rate 162 bpm. Asymptomatic.\par \par Settings:\par RA: Amplitude 2.5V, pulse width 0.4 ms, sensitivity 0.1 mV\par RV: Amplitude 1.25 (auto), pulse width 0.4 ms, sensitivity 2mV\par \par \par He has history of this, however given prior history of syncopal fall and SDH, EP advised no AC unless prolonged AF. On ASA 81mg daily. Will continue to monitor. \par \par \par \par Pt declines remote monitoring. \par \par F/U: with EP in 1 month.\par Discussed red flag symptoms, which would warrant sooner or emergent medical evaluation.\par Any questions and concerns were addressed and resolved.\par \par Sincerely,\par Kathleen Tariq [facility]\par Patient's history, testing, and plan was reviewed with supervising physician, Dr. Patrick Valentino

## 2022-12-13 NOTE — HISTORY OF PRESENT ILLNESS
[FreeTextEntry1] : ROMA PULIDO is a 82 year old male with a past medical history of aortic stenosis, ascending aortic aneurysm, status post single-vessel CABG, bioprosthetic AVR, and ascending aortic repair at [facility] in April 2013, postop heart block, [employer] permanent pacemaker implant in April 2013 at [place], hypertension and dyslipidemia, Babisiosis, thrombocytopenia, multiple platelet transfusion, hematology Dr Agarwal, syncopal fall head trauma SDH admit [facility] August 2014, Prostate CA radiation therapy.\par \par BioAVR ascending aorta repair 2013, CHB, PPM 2013, syncope fall SDH CVA 2014. \par \par \par Last sen 5/21/22. In the interim had cataract surgery. See details of testing below. Endorses some ?reflux. Endorses an episode of dizziness where he fell. Denies syncope. Unclear if it correlates with anything on device as he is a poor historian. There is claudication. No SOB, palpitations, syncope, and edema. Active smoker. Small amounts of exercise.\par \par 2 minutes of AT/AF seen on device check.\par \par See device note from today.\par \par Testing:\par \par Echo 11/30/22:CONCLUSIONS:\par 1. Left ventricular ejection fraction is visually estimated at 55%.\par 2. Moderate (Grade 2) left ventricular diastolic dysfunction.\par 3. Mildly enlarged right ventricular size.\par 4. Mild mitral valve regurgitation.\par 5. Device wire is visualized.\par 6. Estimated pulmonary artery systolic pressure is 37 mmHg.\par 7. Left ventricular endocardium is not well visualized.\par 8. Mild to moderate tricuspid regurgitation. inferior vena cava diameter of 1.98 cm, which\par collapses greater than 50% with a sniff which suggests normal RA pressure.\par 9. No pericardial effusion seen.\par 10. Normal pericardium.\par 11. Compared to prior on 10/12/2021- Decrease in LA volume, Mild RVE noted on todays exam,\par Increase in PASP.\par \par Carotid u/s 11/30/22: CONCLUSIONS:\par 1. Vertebral: antegrade flow bilaterally.\par 2. Mild atherosclerosis seen bilaterally.\par 3. Compared to prior on 10/12/2021- No significant changes.\par \par Abd u/s 11/30/22:CONCLUSIONS:\par 1. Proximal abdominal aortic measurements are suboptimal. No AAA from midbifucation\par noted.\par 2. Mild heterogenous atherosclerosis seen throughout abdominal aorta.\par \par Labs 4/8/22: WBC 7.1, Hgb 13.6, HCT 40.4, plt 172, Cr 1.07, Ca 9.2, AST 16, ALT 13, Chol 177, HDL 38, Trigs 173, , A1C 5.7, TSH 2.1, Mag 1.8, CRP 2.33, Uric acid 7.7, Na 142, K 4.5.\par \par Nuke 10/19/21: Negative by EKG. Medium sized, mild defect in lateral wall that is reversible, with normal motion and sig correction by prone images most likely suggestive of mild ischemia apical lateral. The observed deedeffects partially correct with prone imaging.

## 2022-12-13 NOTE — HISTORY OF PRESENT ILLNESS
[FreeTextEntry1] : ROMA PULIDO is a 82 year old male with a past medical history of aortic stenosis, ascending aortic aneurysm, status post single-vessel CABG, bioprosthetic AVR, and ascending aortic repair at [facility] in April 2013, postop heart block, [employer]irit permanent pacemaker implant in April 2013 at [place], hypertension and dyslipidemia, Babisiosis, thrombocytopenia, multiple platelet transfusion, hematology Dr Agarwal, syncopal fall head trauma SDH admit [facility] August 2014, Prostate CA radiation therapy.\par \par BioAVR ascending aorta repair 2013, CHB, PPM 2013, syncope fall SDH CVA 2014. \par \par \par Last sen 5/21/22. In the interim had cataract surgery. See details of testing below. Endorses some ?reflux. Endorses an episode of dizziness where he fell. Denies syncope. Unclear if it correlates with anything on device as he is a poor historian. There is claudication. No SOB, palpitations, syncope, and edema. Active smoker. Small amounts of exercise.\par \par 2 minutes of AT/AF seen on device check.\par \par See device note from today.\par \par Testing:\par \par Echo 11/30/22:CONCLUSIONS:\par 1. Left ventricular ejection fraction is visually estimated at 55%.\par 2. Moderate (Grade 2) left ventricular diastolic dysfunction.\par 3. Mildly enlarged right ventricular size.\par 4. Mild mitral valve regurgitation.\par 5. Device wire is visualized.\par 6. Estimated pulmonary artery systolic pressure is 37 mmHg.\par 7. Left ventricular endocardium is not well visualized.\par 8. Mild to moderate tricuspid regurgitation. inferior vena cava diameter of 1.98 cm, which\par collapses greater than 50% with a sniff which suggests normal RA pressure.\par 9. No pericardial effusion seen.\par 10. Normal pericardium.\par 11. Compared to prior on 10/12/2021- Decrease in LA volume, Mild RVE noted on todays exam,\par Increase in PASP.\par \par Carotid u/s 11/30/22: CONCLUSIONS:\par 1. Vertebral: antegrade flow bilaterally.\par 2. Mild atherosclerosis seen bilaterally.\par 3. Compared to prior on 10/12/2021- No significant changes.\par \par Abd u/s 11/30/22:CONCLUSIONS:\par 1. Proximal abdominal aortic measurements are suboptimal. No AAA from midbifucation\par noted.\par 2. Mild heterogenous atherosclerosis seen throughout abdominal aorta.\par \par Labs 4/8/22: WBC 7.1, Hgb 13.6, HCT 40.4, plt 172, Cr 1.07, Ca 9.2, AST 16, ALT 13, Chol 177, HDL 38, Trigs 173, , A1C 5.7, TSH 2.1, Mag 1.8, CRP 2.33, Uric acid 7.7, Na 142, K 4.5.\par \par Nuke 10/19/21: Negative by EKG. Medium sized, mild defect in lateral wall that is reversible, with normal motion and sig correction by prone images most likely suggestive of mild ischemia apical lateral. The observed deedeffects partially correct with prone imaging.

## 2022-12-13 NOTE — PROCEDURE
[No] : not [NSR] : normal sinus rhythm [See Device Printout] : See device printout [Pacemaker] : pacemaker [DDD] : DDD [Threshold Testing Performed] : Threshold testing was performed [___ ms] : [unfilled] ms [None] : none [Counters Reset] : the counters were reset [Voltage: ___ volts] : Voltage was [unfilled] volts [___V @] : [unfilled] V [Lead Imp:  ___ohms] : lead impedance was [unfilled] ohms [Sensing Amplitude ___mv] : sensing amplitude was [unfilled] mv [de-identified] : RAZA  [de-identified] : [employer]  [de-identified] : [id number] [de-identified] : 4-24-13 [de-identified] :  [de-identified] : Battery longevity 1.5 years [de-identified] : \par AP 46%\par  <1 %\par \par Mode Switch <1%\par \par 2 episodes of AT/AF with RVR. Longest approx 2 minutes, rate 162 bpm. Asymptomatic.\par \par Settings:\par RA: Amplitude 2.5V, pulse width 0.4 ms, sensitivity 0.1 mV\par RV: Amplitude 1.25 (auto), pulse width 0.4 ms, sensitivity 2mV\par \par \par He has history of this, however given prior history of syncopal fall and SDH, EP advised no AC unless prolonged AF. On ASA 81mg daily. Will continue to monitor. \par \par \par \par Pt declines remote monitoring. \par \par F/U: with EP in 1 month.\par Discussed red flag symptoms, which would warrant sooner or emergent medical evaluation.\par Any questions and concerns were addressed and resolved.\par \par Sincerely,\par Kathleen Tariq [facility]\par Patient's history, testing, and plan was reviewed with supervising physician, Dr. Patrick Valentino

## 2022-12-15 ENCOUNTER — FORM ENCOUNTER (OUTPATIENT)
Age: 82
End: 2022-12-15

## 2023-01-05 ENCOUNTER — APPOINTMENT (OUTPATIENT)
Dept: CARDIOLOGY | Facility: CLINIC | Age: 83
End: 2023-01-05
Payer: MEDICARE

## 2023-01-05 ENCOUNTER — FORM ENCOUNTER (OUTPATIENT)
Age: 83
End: 2023-01-05

## 2023-01-05 PROCEDURE — 93925 LOWER EXTREMITY STUDY: CPT

## 2023-01-10 ENCOUNTER — APPOINTMENT (OUTPATIENT)
Dept: CARDIOLOGY | Facility: CLINIC | Age: 83
End: 2023-01-10
Payer: MEDICARE

## 2023-01-10 PROCEDURE — 93015 CV STRESS TEST SUPVJ I&R: CPT

## 2023-01-10 PROCEDURE — A9502: CPT

## 2023-01-10 PROCEDURE — 78452 HT MUSCLE IMAGE SPECT MULT: CPT

## 2023-01-11 ENCOUNTER — OFFICE (OUTPATIENT)
Dept: URBAN - METROPOLITAN AREA CLINIC 97 | Facility: CLINIC | Age: 83
Setting detail: OPHTHALMOLOGY
End: 2023-01-11
Payer: COMMERCIAL

## 2023-01-11 DIAGNOSIS — H35.372: ICD-10-CM

## 2023-01-11 DIAGNOSIS — H02.831: ICD-10-CM

## 2023-01-11 DIAGNOSIS — H40.1112: ICD-10-CM

## 2023-01-11 DIAGNOSIS — H40.1121: ICD-10-CM

## 2023-01-11 DIAGNOSIS — H16.223: ICD-10-CM

## 2023-01-11 DIAGNOSIS — H02.834: ICD-10-CM

## 2023-01-11 DIAGNOSIS — Z96.1: ICD-10-CM

## 2023-01-11 DIAGNOSIS — H26.493: ICD-10-CM

## 2023-01-11 DIAGNOSIS — H02.832: ICD-10-CM

## 2023-01-11 DIAGNOSIS — H02.835: ICD-10-CM

## 2023-01-11 DIAGNOSIS — H35.3131: ICD-10-CM

## 2023-01-11 PROCEDURE — 92134 CPTRZ OPH DX IMG PST SGM RTA: CPT | Performed by: OPHTHALMOLOGY

## 2023-01-11 PROCEDURE — 99024 POSTOP FOLLOW-UP VISIT: CPT | Performed by: OPHTHALMOLOGY

## 2023-01-11 ASSESSMENT — SUPERFICIAL PUNCTATE KERATITIS (SPK)
OS_SPK: T
OD_SPK: T

## 2023-01-11 ASSESSMENT — REFRACTION_AUTOREFRACTION
OS_SPHERE: +0.25
OD_CYLINDER: +1.25
OD_AXIS: 027
OS_AXIS: 148
OS_CYLINDER: +0.50
OD_SPHERE: -0.75

## 2023-01-11 ASSESSMENT — REFRACTION_MANIFEST
OD_AXIS: 010
OD_VA1: 20/50-2
OS_CYLINDER: +1.00
OS_CYLINDER: +0.75
OS_ADD: +3.00
OS_AXIS: 180
OD_ADD: +3.00
OD_SPHERE: -0.25
OD_CYLINDER: +1.75
OS_VA2: 20/30(J2)
OS_SPHERE: +0.25
OS_AXIS: 150
OU_VA: 20/50-1
OS_VA1: 20/NI
OD_SPHERE: -1.00
OD_VA2: 20/30(J2)
OD_ADD: +3.00
OD_VA1: 20/60+2
OD_VA2: 20/30(J2)
OD_AXIS: 025
OS_SPHERE: +0.75
OS_VA2: 20/30(J2)
OS_VA1: 20/50-2
OD_CYLINDER: +2.50
OU_VA: 20/50-
OS_ADD: +3.00

## 2023-01-11 ASSESSMENT — REFRACTION_CURRENTRX
OD_AXIS: 009
OD_VPRISM_DIRECTION: SV
OD_OVR_VA: 20/
OS_OVR_VA: 20/
OD_CYLINDER: +1.75
OD_SPHERE: -0.50
OD_CYLINDER: +1.50
OD_OVR_VA: 20/
OS_CYLINDER: +1.00
OS_VPRISM_DIRECTION: SV
OS_AXIS: 172
OS_VPRISM_DIRECTION: SV
OS_OVR_VA: 20/
OD_VPRISM_DIRECTION: SV
OD_SPHERE: +2.75
OS_AXIS: 180
OS_SPHERE: +3.75
OS_OVR_VA: 20/
OS_CYLINDER: +1.00
OD_AXIS: 005
OD_OVR_VA: 20/
OD_SPHERE: +2.50
OD_VPRISM_DIRECTION: SV
OS_SPHERE: +0.75
OS_SPHERE: +2.50
OS_VPRISM_DIRECTION: SV

## 2023-01-11 ASSESSMENT — KERATOMETRY
METHOD_AUTO_MANUAL: AUTO
OD_K2POWER_DIOPTERS: 44.50
OD_K1POWER_DIOPTERS: 43.50
OS_K2POWER_DIOPTERS: 43.75
OS_K1POWER_DIOPTERS: 43.00
OS_AXISANGLE_DEGREES: 156
OD_AXISANGLE_DEGREES: 012

## 2023-01-11 ASSESSMENT — TONOMETRY
OS_IOP_MMHG: 12
OD_IOP_MMHG: 14

## 2023-01-11 ASSESSMENT — PACHYMETRY
OS_CT_UM: 526
OS_CT_CORRECTION: 1

## 2023-01-11 ASSESSMENT — SPHEQUIV_DERIVED
OS_SPHEQUIV: 1.25
OD_SPHEQUIV: -0.125
OS_SPHEQUIV: 0.625
OD_SPHEQUIV: 0.625
OS_SPHEQUIV: 0.5
OD_SPHEQUIV: 0.25

## 2023-01-11 ASSESSMENT — VISUAL ACUITY
OS_BCVA: 20/60+1
OD_BCVA: 20/60+2

## 2023-01-11 ASSESSMENT — AXIALLENGTH_DERIVED
OS_AL: 23.396
OD_AL: 23.3142
OS_AL: 23.1589
OS_AL: 23.4439
OD_AL: 23.4577
OD_AL: 23.1723

## 2023-01-11 ASSESSMENT — CONFRONTATIONAL VISUAL FIELD TEST (CVF)
OD_FINDINGS: FULL
OS_FINDINGS: FULL

## 2023-01-11 ASSESSMENT — LID POSITION - DERMATOCHALASIS
OD_DERMATOCHALASIS: RLL RUL 2+
OS_DERMATOCHALASIS: LLL LUL 2+

## 2023-01-19 ENCOUNTER — APPOINTMENT (OUTPATIENT)
Dept: CARDIOLOGY | Facility: CLINIC | Age: 83
End: 2023-01-19
Payer: MEDICARE

## 2023-01-19 VITALS
WEIGHT: 165 LBS | OXYGEN SATURATION: 97 % | TEMPERATURE: 98 F | SYSTOLIC BLOOD PRESSURE: 132 MMHG | DIASTOLIC BLOOD PRESSURE: 64 MMHG | HEIGHT: 68 IN | BODY MASS INDEX: 25.01 KG/M2 | HEART RATE: 71 BPM

## 2023-01-19 PROCEDURE — 99215 OFFICE O/P EST HI 40 MIN: CPT

## 2023-01-19 NOTE — REASON FOR VISIT
[Other: ____] : [unfilled] [Follow-up Device Check] : follow-up device check visit [FreeTextEntry1] : Bill is a 82-year-old male with history of aortic stenosis, ascending aortic aneurysm, status post single-vessel CABG, bioprosthetic AVR, and ascending aortic repair at [facility] in April 2013, postop heart block, [employer]irit permanent pacemaker implant in April 2013 at [place], hypertension and dyslipidemia, Babisiosis, thrombocytopenia, multiple platelet transfusion, hematology Dr Agarwal, syncopal fall head trauma SDH admit [facility] August 2014, Prostate CA radiation therapy\par \par Cardiovascular review of symptoms is negative for exertional chest pain, dyspnea, palpitations, dizziness or syncope. No PND or orthopnea leg edema. No bleeding or black stool.\par \par No exercise routine.  Sedentary lifestyle.  Patient is walking 5 minutes on occasion.  He is compliant with his diet medications.\par \par Lexiscan Myoview stress test Jan 2023, LVEF 64%, moderate apical and apical lateral ischemic defect, no chest pain, nonischemic EKG response, baseline atrial pacing PRWP\par \par [employer] permanent pacemaker check  implanted April 2013, dual-chamber A-pacing 36% V-pacing less than 1%, battery greater than 7 years, no episodes, follow-up 3 months\par \par Echocardiogram Oct 2020, LVEF 55-60%, normal bioprosthetic AVR, mild MR and TR, mild pulmonary hypertension\par \par Echocardiogram July 2019, LVEF 60%, normal bioprosthetic AVR, mild diastolic dysfunction, mild MR, normal RVSP\par  \par Lexascan Myoview stress July 2017, LVEF 54% diaphragm attenuation, no ischemia nonischemic EKG response, baseline sinus rhythm PRWP, NSST, no angina\par \par 2-D echo April 2017, LVEF 55%, function, normal bioprosthetic AVR, mild/moderate MR, normal aortic valve gradient, mild TR, PASP 35\par \par Carotid and abdominal ultrasound April 2017, nonobstructive, proximal aorta 2.8 cm.\par  \par

## 2023-01-19 NOTE — DISCUSSION/SUMMARY
[FreeTextEntry1] : Bill is a 82-year-old male with medical history detailed above and active medical issues including: \par \par - Limited exercise, abnormal Myoview stress test with moderate apical lateral ischemia.  Risks and options of cardiac catheterization have been discussed, including the risk of bleeding stroke heart attack.  Patient wishes to proceed and will be scheduled for catheterization within one week.  Aspirin  will be continued until catheterization.  If persistent chest pain patient will call 911 and go to the emergency room.\par \par - History of severe aortic stenosis, abnormal exercise stress echo, status post single-vessel CABG, bioprosthetic AVR, aortic root repair in April 2013.\par \par - Brief PAF CHADS VASc score 3, history of subdural hematoma, moderate risk for oral anticoagulation,  electrophysiologist Dr. Saad Reyes, no AC unless prolonged PAF.\par \par - Postop heart block, [employer] Kirit dual chamber permanent pacemaker implant in April 2013.\par \par - Hypertension at goal <130/80, follow up home BP daily over one week.\par \par - History of Babesiosis/thrombocytopenia following with hematology \par \par - Dyslipidemia intolerant to statins .\par \par - Prior tobacco abuse.\par \par Advised patient to follow active lifestyle with regular cardiovascular exercise. Patient educated on lifestyle and diet modification with low sodium low fat diet and avoidance of excessive alcohol. Patient is aware to call with any symptoms or concerns. \par \par Cardiology follow-up after catheterization..  Current cardiac medications remain unchanged. Repeat labs will be ordered with PMD.\par \par Bill will follow up with Dr. Bennett for primary care. \par

## 2023-01-19 NOTE — PROCEDURE
[No] : not [NSR] : normal sinus rhythm [Pacemaker] : pacemaker [DDD] : DDD [Voltage: ___ volts] : Voltage was [unfilled] volts [Lead Imp:  ___ohms] : lead impedance was [unfilled] ohms [Sensing Amplitude ___mv] : sensing amplitude was [unfilled] mv [___V @] : [unfilled] V [___ ms] : [unfilled] ms [None] : none [Threshold Testing Performed] : Threshold testing was not performed [de-identified] : RAZA  [de-identified] : [employer]  [de-identified] : [id number] [de-identified] : 4-24-13 [de-identified] : 62 [de-identified] : 8.2-9.3 years.  [de-identified] : AP 30%\par  <1 %\par 1 Episodes AF < 10s and one episode of Aflutter vs SVT <10 s\par \par He has history of this, however given prior history of syncopal fall and SDH, EP advised no AC unless prolonged AF. \par \par Continue 3 months follow up. \par

## 2023-01-19 NOTE — PHYSICAL EXAM
[Well Developed] : well developed [Well Nourished] : well nourished [No Acute Distress] : no acute distress [Normal Venous Pressure] : normal venous pressure [No Carotid Bruit] : no carotid bruit [Normal S1, S2] : normal S1, S2 [No Rub] : no rub [No Gallop] : no gallop [Clear Lung Fields] : clear lung fields [Good Air Entry] : good air entry [No Respiratory Distress] : no respiratory distress  [Soft] : abdomen soft [Non Tender] : non-tender [No Masses/organomegaly] : no masses/organomegaly [Normal Bowel Sounds] : normal bowel sounds [Normal Gait] : normal gait [No Edema] : no edema [No Cyanosis] : no cyanosis [No Clubbing] : no clubbing [No Varicosities] : no varicosities [No Rash] : no rash [No Skin Lesions] : no skin lesions [Moves all extremities] : moves all extremities [No Focal Deficits] : no focal deficits [Normal Speech] : normal speech [Alert and Oriented] : alert and oriented [Normal memory] : normal memory [General Appearance - Well Developed] : well developed [Normal Appearance] : normal appearance [Well Groomed] : well groomed [General Appearance - Well Nourished] : well nourished [No Deformities] : no deformities [General Appearance - In No Acute Distress] : no acute distress [Normal Conjunctiva] : the conjunctiva exhibited no abnormalities [Eyelids - No Xanthelasma] : the eyelids demonstrated no xanthelasmas [Normal Oral Mucosa] : normal oral mucosa [No Oral Pallor] : no oral pallor [No Oral Cyanosis] : no oral cyanosis [Normal Jugular Venous A Waves Present] : normal jugular venous A waves present [Normal Jugular Venous V Waves Present] : normal jugular venous V waves present [No Jugular Venous Paiz A Waves] : no jugular venous paiz A waves [Respiration, Rhythm And Depth] : normal respiratory rhythm and effort [Exaggerated Use Of Accessory Muscles For Inspiration] : no accessory muscle use [Auscultation Breath Sounds / Voice Sounds] : lungs were clear to auscultation bilaterally [Heart Rate And Rhythm] : heart rate and rhythm were normal [Heart Sounds] : normal S1 and S2 [Systolic grade ___/6] : A grade [unfilled]/6 systolic murmur was heard. [Abdomen Soft] : soft [Abdomen Tenderness] : non-tender [Abdomen Mass (___ Cm)] : no abdominal mass palpated [Abnormal Walk] : normal gait [Gait - Sufficient For Exercise Testing] : the gait was sufficient for exercise testing [Nail Clubbing] : no clubbing of the fingernails [Cyanosis, Localized] : no localized cyanosis [Petechial Hemorrhages (___cm)] : no petechial hemorrhages [Skin Color & Pigmentation] : normal skin color and pigmentation [] : no rash [No Venous Stasis] : no venous stasis [Skin Lesions] : no skin lesions [No Skin Ulcers] : no skin ulcer [No Xanthoma] : no  xanthoma was observed [Oriented To Time, Place, And Person] : oriented to person, place, and time [Affect] : the affect was normal [Mood] : the mood was normal [No Anxiety] : not feeling anxious [de-identified] : 2/6 KRYSTINA, nl AVR click [FreeTextEntry1] : 2/6 KRYSTINA, nl AVR click, right upper chest PPM

## 2023-01-23 ENCOUNTER — APPOINTMENT (OUTPATIENT)
Dept: ORTHOPEDIC SURGERY | Facility: CLINIC | Age: 83
End: 2023-01-23
Payer: MEDICARE

## 2023-01-23 VITALS — WEIGHT: 165 LBS | HEIGHT: 68 IN | BODY MASS INDEX: 25.01 KG/M2

## 2023-01-23 PROCEDURE — 99214 OFFICE O/P EST MOD 30 MIN: CPT

## 2023-01-23 NOTE — HISTORY OF PRESENT ILLNESS
[Lower back] : lower back [Gradual] : gradual [7] : 7 [6] : 6 [Constant] : constant [Nothing helps with pain getting better] : Nothing helps with pain getting better [Sitting] : sitting [Retired] : Work status: retired [de-identified] : Follow up lumbar spine CT Results at [facility]. Experiencing pain radiating down the left leg. Finished PT. Taking Tylenol PRN. [] : no

## 2023-01-23 NOTE — ASSESSMENT
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 \par \par Continue PT.\par \par Follow up in 2 months

## 2023-01-23 NOTE — DATA REVIEWED
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

## 2023-01-31 LAB
ANION GAP SERPL CALC-SCNC: 13 MMOL/L
BASOPHILS # BLD AUTO: 0.07 K/UL
BASOPHILS NFR BLD AUTO: 0.8 %
BUN SERPL-MCNC: 16 MG/DL
CALCIUM SERPL-MCNC: 9.5 MG/DL
CHLORIDE SERPL-SCNC: 104 MMOL/L
CO2 SERPL-SCNC: 23 MMOL/L
CREAT SERPL-MCNC: 1.35 MG/DL
EGFR: 53 ML/MIN/1.73M2
EOSINOPHIL # BLD AUTO: 0.32 K/UL
EOSINOPHIL NFR BLD AUTO: 3.7 %
GLUCOSE SERPL-MCNC: 172 MG/DL
HCT VFR BLD CALC: 40 %
HGB BLD-MCNC: 13 G/DL
IMM GRANULOCYTES NFR BLD AUTO: 0.8 %
LYMPHOCYTES # BLD AUTO: 1.06 K/UL
LYMPHOCYTES NFR BLD AUTO: 12.2 %
MAN DIFF?: NORMAL
MCHC RBC-ENTMCNC: 30.8 PG
MCHC RBC-ENTMCNC: 32.5 GM/DL
MCV RBC AUTO: 94.8 FL
MONOCYTES # BLD AUTO: 0.86 K/UL
MONOCYTES NFR BLD AUTO: 9.9 %
NEUTROPHILS # BLD AUTO: 6.3 K/UL
NEUTROPHILS NFR BLD AUTO: 72.6 %
PLATELET # BLD AUTO: 184 K/UL
POTASSIUM SERPL-SCNC: 4.9 MMOL/L
RBC # BLD: 4.22 M/UL
RBC # FLD: 12.9 %
SODIUM SERPL-SCNC: 140 MMOL/L
WBC # FLD AUTO: 8.68 K/UL

## 2023-02-07 ENCOUNTER — APPOINTMENT (OUTPATIENT)
Dept: CARDIOLOGY | Facility: CLINIC | Age: 83
End: 2023-02-07
Payer: MEDICARE

## 2023-02-07 VITALS
BODY MASS INDEX: 25.76 KG/M2 | OXYGEN SATURATION: 97 % | SYSTOLIC BLOOD PRESSURE: 140 MMHG | HEART RATE: 63 BPM | WEIGHT: 170 LBS | HEIGHT: 68 IN | TEMPERATURE: 97.3 F | DIASTOLIC BLOOD PRESSURE: 72 MMHG

## 2023-02-07 DIAGNOSIS — Z00.00 ENCOUNTER FOR GENERAL ADULT MEDICAL EXAMINATION W/OUT ABNORMAL FINDINGS: ICD-10-CM

## 2023-02-07 PROCEDURE — 99215 OFFICE O/P EST HI 40 MIN: CPT

## 2023-02-07 NOTE — DISCUSSION/SUMMARY
[FreeTextEntry1] : Bill is a 82-year-old male with medical history detailed above and active medical issues including: \par \par - Limited exercise, abnormal Myoview stress test with moderate apical lateral ischemia.  Risks and options of cardiac catheterization have been discussed, including the risk of bleeding stroke heart attack.  Patient wishes to proceed and will be scheduled for catheterization within one week.  Aspirin  will be continued until catheterization.  If persistent chest pain patient will call 911 and go to the emergency room.\par \par - History of severe aortic stenosis, abnormal exercise stress echo, status post single-vessel CABG, bioprosthetic AVR, aortic root repair in April 2013.\par \par - Brief PAF CHADS VASc score 3, history of subdural hematoma, moderate risk for oral anticoagulation,  electrophysiologist Dr. Saad Reyes, no AC unless prolonged PAF.\par \par - Postop heart block, [employer] Kirit dual chamber permanent pacemaker implant in April 2013.\par \par - Hypertension at goal <130/80, follow up home BP daily over one week.\par \par - History of Babesiosis/thrombocytopenia following with hematology \par \par - Dyslipidemia intolerant to statins .\par \par - Prior tobacco abuse.\par \par Advised patient to follow active lifestyle with regular cardiovascular exercise. Patient educated on lifestyle and diet modification with low sodium low fat diet and avoidance of excessive alcohol. Patient is aware to call with any symptoms or concerns. \par \par

## 2023-02-07 NOTE — PROCEDURE
[No] : not [NSR] : normal sinus rhythm [Pacemaker] : pacemaker [DDD] : DDD [Voltage: ___ volts] : Voltage was [unfilled] volts [Lead Imp:  ___ohms] : lead impedance was [unfilled] ohms [Sensing Amplitude ___mv] : sensing amplitude was [unfilled] mv [___V @] : [unfilled] V [___ ms] : [unfilled] ms [None] : none [Threshold Testing Performed] : Threshold testing was not performed [de-identified] : RAZA  [de-identified] : [employer]  [de-identified] : [id number] [de-identified] : 4-24-13 [de-identified] : 62 [de-identified] : 8.2-9.3 years.  [de-identified] : AP 30%\par  <1 %\par 1 Episodes AF < 10s and one episode of Aflutter vs SVT <10 s\par \par He has history of this, however given prior history of syncopal fall and SDH, EP advised no AC unless prolonged AF. \par \par Continue 3 months follow up. \par

## 2023-02-07 NOTE — PHYSICAL EXAM
[Well Developed] : well developed [Well Nourished] : well nourished [No Acute Distress] : no acute distress [Normal Venous Pressure] : normal venous pressure [No Carotid Bruit] : no carotid bruit [Normal S1, S2] : normal S1, S2 [No Rub] : no rub [No Gallop] : no gallop [Clear Lung Fields] : clear lung fields [Good Air Entry] : good air entry [No Respiratory Distress] : no respiratory distress  [Soft] : abdomen soft [Non Tender] : non-tender [No Masses/organomegaly] : no masses/organomegaly [Normal Bowel Sounds] : normal bowel sounds [Normal Gait] : normal gait [No Edema] : no edema [No Cyanosis] : no cyanosis [No Clubbing] : no clubbing [No Varicosities] : no varicosities [No Rash] : no rash [No Skin Lesions] : no skin lesions [Moves all extremities] : moves all extremities [No Focal Deficits] : no focal deficits [Normal Speech] : normal speech [Alert and Oriented] : alert and oriented [Normal memory] : normal memory [General Appearance - Well Developed] : well developed [Normal Appearance] : normal appearance [Well Groomed] : well groomed [General Appearance - Well Nourished] : well nourished [No Deformities] : no deformities [General Appearance - In No Acute Distress] : no acute distress [Normal Conjunctiva] : the conjunctiva exhibited no abnormalities [Eyelids - No Xanthelasma] : the eyelids demonstrated no xanthelasmas [Normal Oral Mucosa] : normal oral mucosa [No Oral Pallor] : no oral pallor [No Oral Cyanosis] : no oral cyanosis [Normal Jugular Venous A Waves Present] : normal jugular venous A waves present [Normal Jugular Venous V Waves Present] : normal jugular venous V waves present [No Jugular Venous Paiz A Waves] : no jugular venous paiz A waves [Respiration, Rhythm And Depth] : normal respiratory rhythm and effort [Exaggerated Use Of Accessory Muscles For Inspiration] : no accessory muscle use [Auscultation Breath Sounds / Voice Sounds] : lungs were clear to auscultation bilaterally [Heart Rate And Rhythm] : heart rate and rhythm were normal [Heart Sounds] : normal S1 and S2 [Systolic grade ___/6] : A grade [unfilled]/6 systolic murmur was heard. [Abdomen Soft] : soft [Abdomen Tenderness] : non-tender [Abdomen Mass (___ Cm)] : no abdominal mass palpated [Abnormal Walk] : normal gait [Gait - Sufficient For Exercise Testing] : the gait was sufficient for exercise testing [Nail Clubbing] : no clubbing of the fingernails [Cyanosis, Localized] : no localized cyanosis [Petechial Hemorrhages (___cm)] : no petechial hemorrhages [Skin Color & Pigmentation] : normal skin color and pigmentation [] : no rash [No Venous Stasis] : no venous stasis [Skin Lesions] : no skin lesions [No Skin Ulcers] : no skin ulcer [No Xanthoma] : no  xanthoma was observed [Oriented To Time, Place, And Person] : oriented to person, place, and time [Affect] : the affect was normal [Mood] : the mood was normal [No Anxiety] : not feeling anxious [de-identified] : 2/6 KRYSTINA, nl AVR click [FreeTextEntry1] : 2/6 KRYSTINA, nl AVR click, right upper chest PPM

## 2023-02-07 NOTE — REASON FOR VISIT
[Other: ____] : [unfilled] [Follow-up Device Check] : follow-up device check visit [FreeTextEntry1] : I saw this 82-year-old male in post cath f/u on 02/07/23.  On cath last week his aortic valve was functioning normally, and his LIMA to the LAD was patent.  He did have proximal LAD disease into the diagonal and also circumflex disease.  In the absence of symptoms it was decided to treat him medically.\par There were no groin access issues.\par \par  history of aortic stenosis, ascending aortic aneurysm, status post single-vessel CABG, bioprosthetic AVR, and ascending aortic repair at [facility] in April 2013, postop heart block, [employer]irit permanent pacemaker implant in April 2013 at [place], hypertension and dyslipidemia, Babisiosis, thrombocytopenia, multiple platelet transfusion, hematology Dr Agarwal, syncopal fall head trauma SDH admit [facility] August 2014, Prostate CA radiation therapy\par \par Cardiovascular review of symptoms is negative for exertional chest pain, dyspnea, palpitations, dizziness or syncope. No PND or orthopnea leg edema. No bleeding or black stool.\par \par No exercise routine.  Sedentary lifestyle.  Patient is walking 5 minutes on occasion.  He is compliant with his diet medications.\par \par Lexiscan Myoview stress test Jan 2023, LVEF 64%, moderate apical and apical lateral ischemic defect, no chest pain, nonischemic EKG response, baseline atrial pacing PRWP\par \par [employer] permanent pacemaker check  implanted April 2013, dual-chamber A-pacing 36% V-pacing less than 1%, battery greater than 7 years, no episodes, follow-up 3 months\par \par Echocardiogram Oct 2020, LVEF 55-60%, normal bioprosthetic AVR, mild MR and TR, mild pulmonary hypertension\par \par Echocardiogram July 2019, LVEF 60%, normal bioprosthetic AVR, mild diastolic dysfunction, mild MR, normal RVSP\par  \par Lexascan Myoview stress July 2017, LVEF 54% diaphragm attenuation, no ischemia nonischemic EKG response, baseline sinus rhythm PRWP, NSST, no angina\par \par 2-D echo April 2017, LVEF 55%, function, normal bioprosthetic AVR, mild/moderate MR, normal aortic valve gradient, mild TR, PASP 35\par \par Carotid and abdominal ultrasound April 2017, nonobstructive, proximal aorta 2.8 cm.\par  \par

## 2023-02-15 ENCOUNTER — OFFICE (OUTPATIENT)
Dept: URBAN - METROPOLITAN AREA CLINIC 97 | Facility: CLINIC | Age: 83
Setting detail: OPHTHALMOLOGY
End: 2023-02-15
Payer: COMMERCIAL

## 2023-02-15 ENCOUNTER — RX ONLY (RX ONLY)
Age: 83
End: 2023-02-15

## 2023-02-15 DIAGNOSIS — H26.493: ICD-10-CM

## 2023-02-15 DIAGNOSIS — H02.834: ICD-10-CM

## 2023-02-15 DIAGNOSIS — H52.4: ICD-10-CM

## 2023-02-15 DIAGNOSIS — H35.372: ICD-10-CM

## 2023-02-15 DIAGNOSIS — H16.223: ICD-10-CM

## 2023-02-15 DIAGNOSIS — H40.1121: ICD-10-CM

## 2023-02-15 DIAGNOSIS — Z96.1: ICD-10-CM

## 2023-02-15 DIAGNOSIS — H40.1112: ICD-10-CM

## 2023-02-15 DIAGNOSIS — H02.835: ICD-10-CM

## 2023-02-15 DIAGNOSIS — H02.831: ICD-10-CM

## 2023-02-15 DIAGNOSIS — H02.832: ICD-10-CM

## 2023-02-15 PROCEDURE — 92015 DETERMINE REFRACTIVE STATE: CPT | Performed by: OPHTHALMOLOGY

## 2023-02-15 PROCEDURE — 92134 CPTRZ OPH DX IMG PST SGM RTA: CPT | Performed by: OPHTHALMOLOGY

## 2023-02-15 PROCEDURE — 99213 OFFICE O/P EST LOW 20 MIN: CPT | Performed by: OPHTHALMOLOGY

## 2023-02-15 ASSESSMENT — REFRACTION_CURRENTRX
OS_OVR_VA: 20/
OD_VPRISM_DIRECTION: SV
OD_VPRISM_DIRECTION: SV
OD_OVR_VA: 20/
OS_OVR_VA: 20/
OD_OVR_VA: 20/
OS_SPHERE: +2.50
OS_AXIS: 180
OS_VPRISM_DIRECTION: SV
OD_AXIS: 009
OS_AXIS: 180
OS_SPHERE: +0.75
OD_SPHERE: -0.50
OS_VPRISM_DIRECTION: SV
OD_OVR_VA: 20/
OS_SPHERE: +2.75
OS_CYLINDER: +1.00
OD_SPHERE: +2.75
OD_VPRISM_DIRECTION: SV
OS_VPRISM_DIRECTION: SV
OD_CYLINDER: +1.50
OD_SPHERE: +2.50
OS_CYLINDER: +1.50
OD_CYLINDER: +1.75
OS_OVR_VA: 20/
OD_AXIS: 003

## 2023-02-15 ASSESSMENT — REFRACTION_MANIFEST
OU_VA: 20/40+2
OD_VA2: 20/20(J1+)
OS_VA2: 20/20(J1+)
OS_CYLINDER: +1.00
OS_CYLINDER: +1.00
OS_VA2: 20/20(J1+)
OD_CYLINDER: +1.50
OS_ADD: +2.75
OS_SPHERE: -0.50
OS_AXIS: 018
OD_ADD: +2.75
OD_VA2: 20/20(J1+)
OD_AXIS: 020
OS_SPHERE: -0.50
OD_ADD: +2.75
OD_SPHERE: -1.00
OD_VA1: 20/60+2
OD_CYLINDER: +1.50
OS_AXIS: 20
OD_SPHERE: -1.00
OS_ADD: +2.75
OU_VA: 20/40+2
OD_VA1: 20/60+2
OD_AXIS: 020
OS_VA1: 20/40-2
OS_VA1: 20/40-2

## 2023-02-15 ASSESSMENT — LID POSITION - DERMATOCHALASIS
OS_DERMATOCHALASIS: LLL LUL 2+
OD_DERMATOCHALASIS: RLL RUL 2+

## 2023-02-15 ASSESSMENT — AXIALLENGTH_DERIVED
OD_AL: 23.4152
OD_AL: 23.4152
OS_AL: 23.5461
OD_AL: 23.4152
OS_AL: 23.5461
OS_AL: 23.5461

## 2023-02-15 ASSESSMENT — KERATOMETRY
OD_K2POWER_DIOPTERS: 45.00
OS_K1POWER_DIOPTERS: 43.25
METHOD_AUTO_MANUAL: AUTO
OS_K2POWER_DIOPTERS: 44.00
OS_AXISANGLE_DEGREES: 144
OD_K1POWER_DIOPTERS: 43.50
OD_AXISANGLE_DEGREES: 004

## 2023-02-15 ASSESSMENT — SUPERFICIAL PUNCTATE KERATITIS (SPK)
OD_SPK: T
OS_SPK: T

## 2023-02-15 ASSESSMENT — TONOMETRY
OS_IOP_MMHG: 14
OD_IOP_MMHG: 17

## 2023-02-15 ASSESSMENT — SPHEQUIV_DERIVED
OS_SPHEQUIV: 0
OD_SPHEQUIV: -0.25
OS_SPHEQUIV: 0
OD_SPHEQUIV: -0.25
OS_SPHEQUIV: 0
OD_SPHEQUIV: -0.25

## 2023-02-15 ASSESSMENT — CONFRONTATIONAL VISUAL FIELD TEST (CVF)
OS_FINDINGS: FULL
OD_FINDINGS: FULL

## 2023-02-15 ASSESSMENT — VISUAL ACUITY
OD_BCVA: 20/100+2
OS_BCVA: 20/80

## 2023-02-15 ASSESSMENT — REFRACTION_AUTOREFRACTION
OD_SPHERE: -1.00
OS_AXIS: 018
OS_SPHERE: -0.50
OS_CYLINDER: +1.00
OD_AXIS: 020
OD_CYLINDER: +1.50

## 2023-02-15 ASSESSMENT — PACHYMETRY
OS_CT_UM: 526
OS_CT_CORRECTION: 1

## 2023-03-15 ENCOUNTER — OFFICE (OUTPATIENT)
Dept: URBAN - METROPOLITAN AREA CLINIC 97 | Facility: CLINIC | Age: 83
Setting detail: OPHTHALMOLOGY
End: 2023-03-15
Payer: COMMERCIAL

## 2023-03-15 ENCOUNTER — RX ONLY (RX ONLY)
Age: 83
End: 2023-03-15

## 2023-03-15 DIAGNOSIS — H16.223: ICD-10-CM

## 2023-03-15 DIAGNOSIS — H02.831: ICD-10-CM

## 2023-03-15 DIAGNOSIS — H26.493: ICD-10-CM

## 2023-03-15 DIAGNOSIS — H40.1112: ICD-10-CM

## 2023-03-15 DIAGNOSIS — H02.834: ICD-10-CM

## 2023-03-15 DIAGNOSIS — Z96.1: ICD-10-CM

## 2023-03-15 DIAGNOSIS — H35.373: ICD-10-CM

## 2023-03-15 DIAGNOSIS — H35.3131: ICD-10-CM

## 2023-03-15 DIAGNOSIS — H02.835: ICD-10-CM

## 2023-03-15 DIAGNOSIS — H40.1121: ICD-10-CM

## 2023-03-15 DIAGNOSIS — H02.832: ICD-10-CM

## 2023-03-15 PROCEDURE — 99214 OFFICE O/P EST MOD 30 MIN: CPT | Performed by: OPHTHALMOLOGY

## 2023-03-15 PROCEDURE — 92134 CPTRZ OPH DX IMG PST SGM RTA: CPT | Performed by: OPHTHALMOLOGY

## 2023-03-15 ASSESSMENT — REFRACTION_MANIFEST
OD_ADD: +2.75
OD_ADD: +2.75
OS_SPHERE: -0.50
OD_AXIS: 020
OS_CYLINDER: +1.00
OD_VA1: 20/125-
OD_VA2: 20/20(J1+)
OS_VA1: 20/40-2
OS_ADD: +2.75
OD_VA1: 20/60+2
OS_VA1: 20/40-2
OS_CYLINDER: +1.00
OU_VA: 20/40+2
OD_SPHERE: -1.00
OD_CYLINDER: +1.50
OD_VA2: 20/20(J1+)
OS_AXIS: 018
OS_VA2: 20/20(J1+)
OS_VA2: 20/20(J1+)
OS_ADD: +2.75
OS_AXIS: 20
OS_SPHERE: -0.50
OU_VA: 20/40+2
OD_SPHERE: -1.00
OD_CYLINDER: +1.50
OD_AXIS: 020

## 2023-03-15 ASSESSMENT — KERATOMETRY
OD_K2POWER_DIOPTERS: 44.75
OS_K1POWER_DIOPTERS: 43.00
OD_AXISANGLE_DEGREES: 175
OD_K1POWER_DIOPTERS: 43.50
OS_AXISANGLE_DEGREES: 166
METHOD_AUTO_MANUAL: AUTO
OS_K2POWER_DIOPTERS: 43.75

## 2023-03-15 ASSESSMENT — REFRACTION_CURRENTRX
OD_AXIS: 003
OS_OVR_VA: 20/
OD_SPHERE: +2.50
OD_CYLINDER: +1.50
OS_VPRISM_DIRECTION: SV
OS_OVR_VA: 20/
OS_CYLINDER: +1.00
OS_AXIS: 180
OD_SPHERE: +2.75
OD_OVR_VA: 20/
OD_CYLINDER: +1.75
OS_VPRISM_DIRECTION: SV
OD_VPRISM_DIRECTION: SV
OD_AXIS: 009
OD_OVR_VA: 20/
OS_VPRISM_DIRECTION: SV
OS_SPHERE: +0.75
OD_SPHERE: -0.50
OS_SPHERE: +2.50
OS_OVR_VA: 20/
OS_SPHERE: +2.75
OD_VPRISM_DIRECTION: SV
OD_VPRISM_DIRECTION: SV
OS_AXIS: 180
OS_CYLINDER: +1.50
OD_OVR_VA: 20/

## 2023-03-15 ASSESSMENT — AXIALLENGTH_DERIVED
OD_AL: 23.6058
OS_AL: 23.5405
OD_AL: 23.4605
OS_AL: 23.6379
OS_AL: 23.6379
OD_AL: 23.4605

## 2023-03-15 ASSESSMENT — REFRACTION_AUTOREFRACTION
OD_AXIS: 025
OD_SPHERE: -1.75
OD_CYLINDER: +2.25
OS_SPHERE: -0.25
OS_AXIS: 167
OS_CYLINDER: +1.00

## 2023-03-15 ASSESSMENT — SPHEQUIV_DERIVED
OD_SPHEQUIV: -0.625
OS_SPHEQUIV: 0
OS_SPHEQUIV: 0.25
OD_SPHEQUIV: -0.25
OS_SPHEQUIV: 0
OD_SPHEQUIV: -0.25

## 2023-03-15 ASSESSMENT — LID POSITION - DERMATOCHALASIS
OD_DERMATOCHALASIS: RLL RUL 2+
OS_DERMATOCHALASIS: LLL LUL 2+

## 2023-03-15 ASSESSMENT — PACHYMETRY
OS_CT_UM: 526
OS_CT_CORRECTION: 1

## 2023-03-15 ASSESSMENT — SUPERFICIAL PUNCTATE KERATITIS (SPK)
OS_SPK: T
OD_SPK: T

## 2023-03-15 ASSESSMENT — VISUAL ACUITY
OD_BCVA: 20/40-2
OS_BCVA: 20/125-

## 2023-03-15 ASSESSMENT — TONOMETRY
OD_IOP_MMHG: 14
OS_IOP_MMHG: 12

## 2023-03-15 ASSESSMENT — CONFRONTATIONAL VISUAL FIELD TEST (CVF)
OD_FINDINGS: FULL
OS_FINDINGS: FULL

## 2023-03-20 ENCOUNTER — APPOINTMENT (OUTPATIENT)
Dept: ORTHOPEDIC SURGERY | Facility: CLINIC | Age: 83
End: 2023-03-20
Payer: MEDICARE

## 2023-03-20 VITALS — WEIGHT: 170 LBS | BODY MASS INDEX: 25.76 KG/M2 | HEIGHT: 68 IN

## 2023-03-20 PROCEDURE — 99214 OFFICE O/P EST MOD 30 MIN: CPT

## 2023-03-20 NOTE — ASSESSMENT
[FreeTextEntry1] : Multilevel lumbar spondylosis \par Chronic L2 compression fracture \par Retrolisthesis L2-3 \par Moderate to severe stenosis L2-3 \par Moderate stenosis L4-5 \par \par Recommend: - NSAID - Heating pad - Muscle relaxer - Core strengthening exercise - Hamstring stretching exercise Patient is given back rehabilitation exercise book. \par \par Continue PT.\par \par Referral to pain management for injections, follow up 2 weeks after injection. \par \par Follow up in 2 months

## 2023-03-20 NOTE — DATA REVIEWED
[CT Scan] : CT scan [Lumbar Spine] : lumbar spine [Report was reviewed and noted in the chart] : The report was reviewed and noted in the chart [I independently reviewed and interpreted images and report] : I independently reviewed and interpreted images and report [FreeTextEntry1] : Multilevel lumbar spondylosis \par Chronic L2 compression fracture \par Retrolisthesis L2-3 \par Moderate to severe stenosis L2-3 \par Moderate stenosis L4-5

## 2023-03-20 NOTE — HISTORY OF PRESENT ILLNESS
[Lower back] : lower back [Gradual] : gradual [7] : 7 [6] : 6 [Constant] : constant [Nothing helps with pain getting better] : Nothing helps with pain getting better [Sitting] : sitting [Retired] : Work status: retired [de-identified] : Follow up lumbar spine. States he has constant pain and trouble walking. Denies PT. Admits to taking Tylenol for pain with no relief. [] : no

## 2023-04-06 ENCOUNTER — FORM ENCOUNTER (OUTPATIENT)
Age: 83
End: 2023-04-06

## 2023-04-16 ENCOUNTER — FORM ENCOUNTER (OUTPATIENT)
Age: 83
End: 2023-04-16

## 2023-04-26 ENCOUNTER — OFFICE (OUTPATIENT)
Dept: URBAN - METROPOLITAN AREA CLINIC 97 | Facility: CLINIC | Age: 83
Setting detail: OPHTHALMOLOGY
End: 2023-04-26
Payer: COMMERCIAL

## 2023-04-26 DIAGNOSIS — H40.1112: ICD-10-CM

## 2023-04-26 DIAGNOSIS — H02.835: ICD-10-CM

## 2023-04-26 DIAGNOSIS — H35.373: ICD-10-CM

## 2023-04-26 DIAGNOSIS — H26.493: ICD-10-CM

## 2023-04-26 DIAGNOSIS — H40.1121: ICD-10-CM

## 2023-04-26 DIAGNOSIS — H02.834: ICD-10-CM

## 2023-04-26 DIAGNOSIS — H35.3131: ICD-10-CM

## 2023-04-26 DIAGNOSIS — H02.831: ICD-10-CM

## 2023-04-26 DIAGNOSIS — Z96.1: ICD-10-CM

## 2023-04-26 DIAGNOSIS — H02.832: ICD-10-CM

## 2023-04-26 DIAGNOSIS — H16.223: ICD-10-CM

## 2023-04-26 PROCEDURE — 92014 COMPRE OPH EXAM EST PT 1/>: CPT | Performed by: OPHTHALMOLOGY

## 2023-04-26 PROCEDURE — 92134 CPTRZ OPH DX IMG PST SGM RTA: CPT | Performed by: OPHTHALMOLOGY

## 2023-04-26 ASSESSMENT — REFRACTION_MANIFEST
OS_VA1: 20/25-2
OS_VA2: 20/25(J1)
OU_VA: 20/40+2
OD_ADD: +2.75
OS_ADD: +3.00
OU_VA: 20/25-2
OS_SPHERE: -0.50
OS_VA2: 20/20(J1+)
OD_CYLINDER: +2.50
OS_SPHERE: PLANO
OS_ADD: +2.75
OD_VA1: 20/50+2
OD_AXIS: 020
OD_CYLINDER: +1.50
OD_SPHERE: -1.00
OS_CYLINDER: +1.25
OD_VA2: 20/25(J1)
OS_CYLINDER: +1.00
OD_VA1: 20/60+2
OD_ADD: +3.00
OD_SPHERE: -0.75
OS_AXIS: 20
OS_AXIS: 170
OS_VA1: 20/40-2
OD_VA2: 20/20(J1+)
OD_AXIS: 005

## 2023-04-26 ASSESSMENT — REFRACTION_CURRENTRX
OD_OVR_VA: 20/
OD_CYLINDER: +1.75
OS_CYLINDER: +1.00
OD_AXIS: 003
OS_SPHERE: +0.75
OD_VPRISM_DIRECTION: SV
OD_SPHERE: -0.50
OS_SPHERE: +2.75
OS_VPRISM_DIRECTION: SV
OD_SPHERE: +2.50
OS_VPRISM_DIRECTION: SV
OD_AXIS: 009
OD_OVR_VA: 20/
OD_SPHERE: +2.75
OS_OVR_VA: 20/
OS_SPHERE: +2.50
OS_AXIS: 180
OD_OVR_VA: 20/
OD_VPRISM_DIRECTION: SV
OS_AXIS: 180
OS_VPRISM_DIRECTION: SV
OD_CYLINDER: +1.50
OD_VPRISM_DIRECTION: SV
OS_CYLINDER: +1.50
OS_OVR_VA: 20/
OS_OVR_VA: 20/

## 2023-04-26 ASSESSMENT — VISUAL ACUITY
OD_BCVA: 20/40-2
OS_BCVA: 20/125-

## 2023-04-26 ASSESSMENT — REFRACTION_AUTOREFRACTION
OD_AXIS: 007
OS_AXIS: 171
OD_SPHERE: -0.75
OS_CYLINDER: +1.25
OS_SPHERE: PLANO
OD_CYLINDER: +2.50

## 2023-04-26 ASSESSMENT — AXIALLENGTH_DERIVED
OD_AL: 23.3087
OS_AL: 23.5919
OD_AL: 23.5975
OD_AL: 23.3087

## 2023-04-26 ASSESSMENT — TONOMETRY
OD_IOP_MMHG: 17
OS_IOP_MMHG: 15

## 2023-04-26 ASSESSMENT — LID POSITION - DERMATOCHALASIS
OD_DERMATOCHALASIS: RLL RUL 2+
OS_DERMATOCHALASIS: LLL LUL 2+

## 2023-04-26 ASSESSMENT — SPHEQUIV_DERIVED
OS_SPHEQUIV: 0
OD_SPHEQUIV: -0.25
OD_SPHEQUIV: 0.5
OD_SPHEQUIV: 0.5

## 2023-04-26 ASSESSMENT — KERATOMETRY
OS_K1POWER_DIOPTERS: 43.00
OD_AXISANGLE_DEGREES: 178
OS_AXISANGLE_DEGREES: 154
METHOD_AUTO_MANUAL: AUTO
OS_K2POWER_DIOPTERS: 44.00
OD_K2POWER_DIOPTERS: 44.50
OD_K1POWER_DIOPTERS: 43.00

## 2023-04-26 ASSESSMENT — SUPERFICIAL PUNCTATE KERATITIS (SPK)
OS_SPK: T
OD_SPK: T

## 2023-04-26 ASSESSMENT — CONFRONTATIONAL VISUAL FIELD TEST (CVF)
OS_FINDINGS: FULL
OD_FINDINGS: FULL

## 2023-04-26 ASSESSMENT — PACHYMETRY
OS_CT_UM: 526
OS_CT_CORRECTION: 1

## 2023-05-03 ENCOUNTER — FORM ENCOUNTER (OUTPATIENT)
Age: 83
End: 2023-05-03

## 2023-05-09 ENCOUNTER — APPOINTMENT (OUTPATIENT)
Dept: CARDIOLOGY | Facility: CLINIC | Age: 83
End: 2023-05-09
Payer: MEDICARE

## 2023-05-09 VITALS
HEART RATE: 68 BPM | SYSTOLIC BLOOD PRESSURE: 140 MMHG | HEIGHT: 68 IN | DIASTOLIC BLOOD PRESSURE: 68 MMHG | BODY MASS INDEX: 26.22 KG/M2 | WEIGHT: 173 LBS | OXYGEN SATURATION: 97 %

## 2023-05-09 PROCEDURE — 99215 OFFICE O/P EST HI 40 MIN: CPT

## 2023-05-09 NOTE — REASON FOR VISIT
[Other: ____] : [unfilled] [Follow-up Device Check] : follow-up device check visit [FreeTextEntry1] : Bill is a 82-year-old male with history of aortic stenosis, ascending aortic aneurysm, status post single-vessel CABG, bioprosthetic AVR, and ascending aortic repair at [facility] in April 2013, postop heart block, [employer]irit permanent pacemaker implant in April 2013 at [place], hypertension and dyslipidemia, Babisiosis, thrombocytopenia, multiple platelet transfusion, hematology Dr Agarwal, syncopal fall head trauma SDH admit [facility] August 2014, Prostate CA radiation therapy\par \par Cardiovascular review of symptoms is negative for exertional chest pain, dyspnea, palpitations, dizziness or syncope. No PND or orthopnea leg edema. No bleeding or black stool.\par \par No exercise routine.  Sedentary lifestyle.  Patient is walking 5 minutes on occasion.  He is compliant with his diet medications.\par \par Lexiscan Myoview stress test Jan 2023, LVEF 64%, moderate apical and apical lateral ischemic defect, no chest pain, nonischemic EKG response, baseline atrial pacing PRWP\par \par [employer] permanent pacemaker check  implanted April 2013, dual-chamber A-pacing 36% V-pacing less than 1%, battery greater than 7 years, no episodes, follow-up 3 months\par \par Echocardiogram Oct 2020, LVEF 55-60%, normal bioprosthetic AVR, mild MR and TR, mild pulmonary hypertension\par \par Echocardiogram July 2019, LVEF 60%, normal bioprosthetic AVR, mild diastolic dysfunction, mild MR, normal RVSP\par  \par Lexascan Myoview stress July 2017, LVEF 54% diaphragm attenuation, no ischemia nonischemic EKG response, baseline sinus rhythm PRWP, NSST, no angina\par \par 2-D echo April 2017, LVEF 55%, function, normal bioprosthetic AVR, mild/moderate MR, normal aortic valve gradient, mild TR, PASP 35\par \par Carotid and abdominal ultrasound April 2017, nonobstructive, proximal aorta 2.8 cm.\par  \par

## 2023-05-09 NOTE — PHYSICAL EXAM
[Well Developed] : well developed [Well Nourished] : well nourished [No Acute Distress] : no acute distress [Normal Venous Pressure] : normal venous pressure [No Carotid Bruit] : no carotid bruit [Normal S1, S2] : normal S1, S2 [No Rub] : no rub [No Gallop] : no gallop [Clear Lung Fields] : clear lung fields [Good Air Entry] : good air entry [No Respiratory Distress] : no respiratory distress  [Soft] : abdomen soft [Non Tender] : non-tender [No Masses/organomegaly] : no masses/organomegaly [Normal Bowel Sounds] : normal bowel sounds [Normal Gait] : normal gait [No Edema] : no edema [No Cyanosis] : no cyanosis [No Clubbing] : no clubbing [No Varicosities] : no varicosities [No Rash] : no rash [No Skin Lesions] : no skin lesions [Moves all extremities] : moves all extremities [No Focal Deficits] : no focal deficits [Normal Speech] : normal speech [Alert and Oriented] : alert and oriented [Normal memory] : normal memory [General Appearance - Well Developed] : well developed [Normal Appearance] : normal appearance [Well Groomed] : well groomed [General Appearance - Well Nourished] : well nourished [No Deformities] : no deformities [General Appearance - In No Acute Distress] : no acute distress [Normal Conjunctiva] : the conjunctiva exhibited no abnormalities [Eyelids - No Xanthelasma] : the eyelids demonstrated no xanthelasmas [Normal Oral Mucosa] : normal oral mucosa [No Oral Pallor] : no oral pallor [No Oral Cyanosis] : no oral cyanosis [Normal Jugular Venous A Waves Present] : normal jugular venous A waves present [Normal Jugular Venous V Waves Present] : normal jugular venous V waves present [No Jugular Venous Paiz A Waves] : no jugular venous paiz A waves [Respiration, Rhythm And Depth] : normal respiratory rhythm and effort [Exaggerated Use Of Accessory Muscles For Inspiration] : no accessory muscle use [Auscultation Breath Sounds / Voice Sounds] : lungs were clear to auscultation bilaterally [Heart Rate And Rhythm] : heart rate and rhythm were normal [Systolic grade ___/6] : A grade [unfilled]/6 systolic murmur was heard. [Heart Sounds] : normal S1 and S2 [Abdomen Soft] : soft [Abdomen Tenderness] : non-tender [Abdomen Mass (___ Cm)] : no abdominal mass palpated [Abnormal Walk] : normal gait [Gait - Sufficient For Exercise Testing] : the gait was sufficient for exercise testing [Nail Clubbing] : no clubbing of the fingernails [Cyanosis, Localized] : no localized cyanosis [Petechial Hemorrhages (___cm)] : no petechial hemorrhages [Skin Color & Pigmentation] : normal skin color and pigmentation [] : no rash [No Venous Stasis] : no venous stasis [Skin Lesions] : no skin lesions [No Skin Ulcers] : no skin ulcer [No Xanthoma] : no  xanthoma was observed [Oriented To Time, Place, And Person] : oriented to person, place, and time [Affect] : the affect was normal [Mood] : the mood was normal [No Anxiety] : not feeling anxious [de-identified] : 2/6 KRYSTINA, nl AVR click [FreeTextEntry1] : 2/6 KRYSTINA, nl AVR click, right upper chest PPM

## 2023-05-09 NOTE — PROCEDURE
[No] : not [NSR] : normal sinus rhythm [Pacemaker] : pacemaker [DDD] : DDD [Voltage: ___ volts] : Voltage was [unfilled] volts [Lead Imp:  ___ohms] : lead impedance was [unfilled] ohms [Sensing Amplitude ___mv] : sensing amplitude was [unfilled] mv [___V @] : [unfilled] V [___ ms] : [unfilled] ms [None] : none [Threshold Testing Performed] : Threshold testing was not performed [de-identified] : RAZA  [de-identified] : [employer]  [de-identified] : [id number] [de-identified] : 4-[birth year] [de-identified] : 62 [de-identified] : 8.2-9.3 years.  [de-identified] : AP 30%\par  <1 %\par 1 Episodes AF < 10s and one episode of Aflutter vs SVT <10 s\par \par He has history of this, however given prior history of syncopal fall and SDH, EP advised no AC unless prolonged AF. \par \par Continue 3 months follow up. \par

## 2023-05-09 NOTE — DISCUSSION/SUMMARY
[FreeTextEntry1] : Bill is a 82-year-old male with medical history detailed above and active medical issues including: \par \par - Limited exercise, abnormal Myoview stress test with moderate apical lateral ischemia.  Stable on medical therapy for moderate CAD with patent grafts cardiac catheterization Feb 2023\par \par - History of severe aortic stenosis, abnormal exercise stress echo, status post single-vessel CABG, bioprosthetic AVR, aortic root repair in April 2013.\par \par - Brief PAF CHADS VASc score 3, history of subdural hematoma, moderate risk for oral anticoagulation,  electrophysiologist Dr. Saad Reyes, no AC unless prolonged PAF.\par \par - Postop heart block, [employer] Kirit dual chamber permanent pacemaker implant in April 2013.\par \par - Hypertension at goal <130/80, follow up home BP daily over one week.\par \par - History of Babesiosis/thrombocytopenia following with hematology \par \par - Dyslipidemia intolerant to statins .\par \par - Prior tobacco abuse.\par \par Advised patient to follow active lifestyle with regular cardiovascular exercise. Patient educated on lifestyle and diet modification with low sodium low fat diet and avoidance of excessive alcohol. Patient is aware to call with any symptoms or concerns. \par \par Cardiology follow-up 6 months with echocardiogram and Doppler studies.  Current cardiac medications remain unchanged. Repeat labs will be ordered with PMD.\par \par Bill will follow up with Dr. Bennett for primary care. \par

## 2023-05-22 ENCOUNTER — FORM ENCOUNTER (OUTPATIENT)
Age: 83
End: 2023-05-22

## 2023-05-22 ENCOUNTER — APPOINTMENT (OUTPATIENT)
Dept: ORTHOPEDIC SURGERY | Facility: CLINIC | Age: 83
End: 2023-05-22
Payer: MEDICARE

## 2023-05-22 VITALS — HEIGHT: 68 IN | BODY MASS INDEX: 26.22 KG/M2 | WEIGHT: 173 LBS

## 2023-05-22 PROCEDURE — 99214 OFFICE O/P EST MOD 30 MIN: CPT

## 2023-05-22 NOTE — HISTORY OF PRESENT ILLNESS
[Lower back] : lower back [Gradual] : gradual [7] : 7 [6] : 6 [Constant] : constant [Nothing helps with pain getting better] : Nothing helps with pain getting better [Sitting] : sitting [Retired] : Work status: retired [de-identified] : Follow up lumbar spine. States he still has constant pain.  Admits to going to PT 2x a week with no relief. Admits to taking Tylenol for pain with no relief. [] : no

## 2023-05-22 NOTE — DISCUSSION/SUMMARY
[Surgical risks reviewed] : Surgical risks reviewed [de-identified] : I discussed non operative and operative treatment options in great detail with the patient. I discussed treatment options, including but not limited to,\par 1. Non operative treatment - rest, NSAID, physical therapy etc.\par 2. Interventional treatment - injections etc.\par 3. Surgical treatment - laminectomy and fusion versus laminectomy and coflex stabilization\par \par Patient wants to proceed with laminectomy and coflex stabilization after failing nonoperative care. I had a lengthy discussion with the patient about the rational and goal of the surgery as well as expected outcome. I encouraged patient to seek a second opinion regarding surgery. I explained postoperative rehabilitation and recovery process to the patient. I discussed risks, benefits and alternatives of the procedure in detail with the patient. I counseled patient about risks and possible complications, including but not limited to, infection, bleeding, nerve injury, arterial and venous injury, single or multiple muscle group weakness, dural tear, CSF leak, pseudomeningocele, arachnoiditis, CSF fistula, meningitis, discitis, osteomyelitis, epidural hematoma, DVT, PE, CRPS, MI, paralysis, spinous process fracture, recurrent/residual stenosis, instrumentation malposition, adjacent segment disease, persistent symptoms, and risks of anesthesia. I explained to the patient that the surgical outcome is unpredictable and there is no guarantee that the symptoms will resolve after the surgery. The patient understands and wishes to proceed. All questions were answered and patient was given information to review.\par

## 2023-05-22 NOTE — ASSESSMENT
[FreeTextEntry1] : \par \par Multilevel lumbar spondylosis \par Chronic L2 compression fracture \par Retrolisthesis L2-3 \par Moderate to severe stenosis L2-3 \par Moderate stenosis L4-5 \par \par \par Patient with persistent symptoms refractory to non operative care. Patient is seeking surgical options. Patient is a candidate for surgery after failing extensive non operative care.

## 2023-06-04 ENCOUNTER — FORM ENCOUNTER (OUTPATIENT)
Age: 83
End: 2023-06-04

## 2023-06-25 NOTE — PROCEDURE
[No] : not [NSR] : normal sinus rhythm [See Device Printout] : See device printout [Pacemaker] : pacemaker [DDD] : DDD No [Threshold Testing Performed] : Threshold testing was performed [___ ms] : [unfilled] ms [None] : none [Counters Reset] : the counters were reset [Voltage: ___ volts] : Voltage was [unfilled] volts [Sensing Amplitude ___mv] : sensing amplitude was [unfilled] mv [Lead Imp:  ___ohms] : lead impedance was [unfilled] ohms [___V @] : [unfilled] V [de-identified] : RAZA  [de-identified] : [employer]  [de-identified] : [id number] [de-identified] : 4-[birth year] [de-identified] :  [de-identified] : 4.3-5 years [de-identified] : \par AP 44%\par  <1 %\par \par Mode Switch <1%\par \par 2 episodes of AT/AF. Longest 8 seconds. Rate controlled. Asymptomatic.\par \par Settings:\par RA: Amplitude 2.5V, pulse width 0.4 ms, sensitivity 0.1 mV\par RV: Amplitude 1.25 (auto), pulse width 0.4 ms, sensitivity 2mV\par \par \par He has history of this, however given prior history of syncopal fall and SDH, EP advised no AC unless prolonged AF. On ASA 81mg daily. Will continue to monitor. \par \par Patient has OV today.\par \par Pt declines remote monitoring. \par \par F/U: DVC in 3 months\par Discussed red flag symptoms, which would warrant sooner or emergent medical evaluation.\par Any questions and concerns were addressed and resolved.\par \par Sincerely,\par Kathleen Tariq FN-BC\par Patient's history, testing, and plan was reviewed with supervising physician, Dr. Patrick Valentino\par

## 2023-06-26 ENCOUNTER — APPOINTMENT (OUTPATIENT)
Dept: ORTHOPEDIC SURGERY | Facility: CLINIC | Age: 83
End: 2023-06-26
Payer: MEDICARE

## 2023-06-26 VITALS — BODY MASS INDEX: 26.22 KG/M2 | HEIGHT: 68 IN | WEIGHT: 173 LBS

## 2023-06-26 PROCEDURE — 99214 OFFICE O/P EST MOD 30 MIN: CPT

## 2023-06-26 NOTE — ASSESSMENT
[FreeTextEntry1] : Surgery - Laminectomy with coflex stabilization L2-3, L4-5 versus Laminectomy and fusion \par \par Multilevel lumbar spondylosis \par Chronic L2 compression fracture \par Retrolisthesis L2-3 \par Moderate to severe stenosis L2-3 \par Moderate stenosis L4-5 \par \par Patient with persistent symptoms refractory to non operative care. Patient is seeking surgical options. Patient is a candidate for surgery after failing extensive non operative care.\par \par Follow up after surgery

## 2023-06-26 NOTE — HISTORY OF PRESENT ILLNESS
[Lower back] : lower back [Gradual] : gradual [7] : 7 [6] : 6 [Constant] : constant [Nothing helps with pain getting better] : Nothing helps with pain getting better [Sitting] : sitting [Retired] : Work status: retired [de-identified] : Follow up lumbar spine. Patient is scheduled for Laminectomy with Coflex Stabilization L2-3, L4-5 on 7/18/23.  States he has constant pain and aching. Denies going to PT due to having surgery on his left leg 2 weeks ago.  Admits to taking Tylenol for pain with no relief. [] : no

## 2023-06-26 NOTE — DISCUSSION/SUMMARY
[Surgical risks reviewed] : Surgical risks reviewed [de-identified] : Surgical risks reviewed. I discussed non operative and operative treatment options in great detail with the patient. I discussed treatment options, including but not limited to,\par 1. Non operative treatment - rest, NSAID, physical therapy etc.\par 2. Interventional treatment - injections etc.\par 3. Surgical treatment - laminectomy and fusion versus laminectomy and coflex stabilization L2-3, L4-5\par \par Patient wants to proceed with laminectomy and coflex stabilization after failing nonoperative care. I had a lengthy discussion with the patient about the rational and goal of the surgery as well as expected outcome. I encouraged patient to seek a second opinion regarding surgery. I explained postoperative rehabilitation and recovery process to the patient. I discussed risks, benefits and alternatives of the procedure in detail with the patient. I counseled patient about risks and possible complications, including but not limited to, infection, bleeding, nerve injury, arterial and venous injury, single or multiple muscle group weakness, dural tear, CSF leak, pseudomeningocele, arachnoiditis, CSF fistula, meningitis, discitis, osteomyelitis, epidural hematoma, DVT, PE, CRPS, MI, paralysis, spinous process fracture, recurrent/residual stenosis, instrumentation malposition, adjacent segment disease, persistent symptoms, and risks of anesthesia. I explained to the patient that the surgical outcome is unpredictable and there is no guarantee that the symptoms will resolve after the surgery. The patient understands and wishes to proceed. All questions were answered and patient was given information to review.

## 2023-06-27 ENCOUNTER — RESULT CHARGE (OUTPATIENT)
Age: 83
End: 2023-06-27

## 2023-06-28 ENCOUNTER — NON-APPOINTMENT (OUTPATIENT)
Age: 83
End: 2023-06-28

## 2023-06-28 ENCOUNTER — APPOINTMENT (OUTPATIENT)
Dept: CARDIOLOGY | Facility: CLINIC | Age: 83
End: 2023-06-28
Payer: MEDICARE

## 2023-06-28 VITALS
WEIGHT: 170 LBS | BODY MASS INDEX: 25.76 KG/M2 | SYSTOLIC BLOOD PRESSURE: 144 MMHG | HEART RATE: 60 BPM | HEIGHT: 68 IN | DIASTOLIC BLOOD PRESSURE: 64 MMHG | OXYGEN SATURATION: 97 %

## 2023-06-28 PROCEDURE — 99215 OFFICE O/P EST HI 40 MIN: CPT | Mod: 25

## 2023-06-28 PROCEDURE — 93000 ELECTROCARDIOGRAM COMPLETE: CPT | Mod: XU

## 2023-06-28 PROCEDURE — 93280 PM DEVICE PROGR EVAL DUAL: CPT

## 2023-06-28 NOTE — PROCEDURE
[No] : not [NSR] : normal sinus rhythm [See Device Printout] : See device printout [Pacemaker] : pacemaker [DDD] : DDD [Voltage: ___ volts] : Voltage was [unfilled] volts [Lead Imp:  ___ohms] : lead impedance was [unfilled] ohms [Sensing Amplitude ___mv] : sensing amplitude was [unfilled] mv [___V @] : [unfilled] V [___ ms] : [unfilled] ms [None] : none [de-identified] : RAZA  [de-identified] : [employer]  [de-identified] : [id number] [de-identified] : [birth year] [de-identified] :  [de-identified] : Battery longevity 12.1 months [de-identified] : \par AP 46%\par  <1 %\par \par Mode Switch <1%\par \par No events.\par \par Settings:\par RA: Amplitude 2.5V, pulse width 0.4 ms, sensitivity 0.1 mV\par RV: Amplitude 1.375 (auto), pulse width 0.4 ms, sensitivity 2mV\par \par \par \par \par \par \par Pt declines remote monitoring. \par \par F/U: DVC in 3 months\par Discussed red flag symptoms, which would warrant sooner or emergent medical evaluation.\par Any questions and concerns were addressed and resolved.\par \par Sincerely,\par Kathleen Tariq FN-BC\par Patient's history, testing, and plan was reviewed with supervising physician, Dr. Patrick Valentino

## 2023-06-28 NOTE — DISCUSSION/SUMMARY
[FreeTextEntry1] : ROMA PULIDO is a 82 year old M who presents today Jun 28, 2023 with the above history and the following active issues:\par \par \par \par History of severe aortic stenosis status post single-vessel CABG, bioprosthetic AVR, aortic root repair in April 2013. Cath 2/1/23:  aortic valve was functioning normally, and his LIMA to the LAD was patent. He did have proximal LAD disease into the diagonal and also circumflex disease. In the absence of symptoms it was decided to treat him medically. Cont ASA, statin, betablocker.\par \par Brief PAF history of subdural hematoma, moderate risk for oral anticoagulation, electrophysiologist Dr. Saad Reyes, no AC unless prolonged PAF. Discussed the possibility of restarting anticoagulation if ok per neurology, patient declines. Risks explained. No PAF on device check today. See DVC note for more details.\par \par Postop heart block, [employer] Kirit dual chamber permanent pacemaker implant in April 2013. Stable DVC check today. See DVC note for more details.\par \par Hypertension Continue Losartan.\par \par History of Babesiosis/thrombocytopenia following with hematology \par \par Dyslipidemia intolerant to statins.\par \par Current smoker. Smoking cessation discussed.\par \par Preoperative cardiovascular examination.\par Patient may proceed with Laminectomy with coflex stabilization L2-L3, 4 and 5 at [facility] with Dr. Meneses on 7/18/23.\par At present, there are no active cardiac conditions. \par No recent unstable coronary syndromes, decompensated heart failure, severe valvular heart disease or significant dysrhythmias.  \par Baseline functional status is acceptable. \par The clinical benefit of the proposed procedure outweighs the associated cardiovascular risk.  \par Risk not attenuated with further CV testing.  \par Prior testing as outlined above.\par Optimized from a cardiovascular perspective.\par Patient may hold ASA 5 days prior to procedure. Resume ASAP post op per surgery.\par No indication for SBE PPX.\par Continue beta blocker\par DVT ppx\par \par Ongoing f/u with PCP.\par \par F/U DVC in 3 months and OV with Dr. Valentino 11/2023 and PRN.\par Discussed red flag symptoms, which would warrant sooner or emergent medical evaluation.\par Any questions and concerns were addressed and resolved.\par \par Sincerely,\par Kathleen Tariq FN-BC\par Patient's history, testing, and plan was reviewed with supervising physician, Dr. Regan Stroud.

## 2023-06-28 NOTE — HISTORY OF PRESENT ILLNESS
[FreeTextEntry1] : ROMA PULIDO is a 82 year old male with a past medical history of aortic stenosis, ascending aortic aneurysm, status post single-vessel CABG, bioprosthetic AVR, and ascending aortic repair at [facility] in April 2013, postop heart block, [employer] Kirit permanent pacemaker implant in April 2013 at [place], hypertension and dyslipidemia, Babisiosis, thrombocytopenia, multiple platelet transfusion, hematology Dr Agarwal, syncopal fall head trauma SDH admit [facility] August 2014, Prostate CA radiation therapy. PAF.\par \par Last seen 5/9/23. In the interim had a procedure on his knee. Presents today, 6/28/23, for cardiac clearance prior to Laminectomy with coflex stabilization L2-L3, 4 and 5. S/he denies chest pain, pressure, palpitations, unusual shortness of breath, orthopnea, LE edema, lightheadedness, dizziness, near syncope or syncope. Acitve smoker. Exercises at home gym 3x a week for 30 minutes without exertional complaints.\par \par /70 on my exam.\par \par Testing:\par \par EKG 6/28/23: A Paced at 60 bpm, ID interval 218 ms, QTc 432 ms, PRWP, nonspecific ST-T wave abnormalities \par \par Cath 2/1/23:  aortic valve was functioning normally, and his LIMA to the LAD was patent. He did have proximal LAD disease into the diagonal and also circumflex disease. In the absence of symptoms it was decided to treat him medically.\par \par Labs 1/30/23: WBC 8.68, Hgb 13, HCT 40, plt 184, Na 140, K 4.9, Cr 1.35, Ca 9.5\par \par Labs 12/28/22: WBC 8.21, Hgb 13.6, HCT 41, plt 160, Na 139, K 4.4, Cr 1.24, Ca 9.5, AST 16, ALT 13, Trigs 308, HDL 29, LDL 76, Chol 167, Mag 2, A1C 5.9, CRP 0.44, CK 47, .3, TSH 1.76, LPa <10\par \par Nuke 1/10/23: Conclusions:\par 1. Myocardial Perfusion: Abnormal.\par 2. The patient underwent stress testing using pharmacological IV regadenoson (bolus injection, 400mcg\par over 10 to 20 seconds followed by 5ml flush) protocol.\par • The total procedure time was 4 minutes . The test was stopped due to Completion of protocol.\par 3. No ischemic ST segment changes Negative for ischemia by EKG.\par 4. Qualitative Perfusion:\par - medium-sized, mild defect(s) in the apical anterior and apical lateral walls that are predominantly\par reversible suggestive of mild ischemia.\par - small-sized, moderate defect(s) in the basal anterolateral wall that is fixed suggestive of an infarct.\par 5. The stress left ventricular EF% is 64 %.\par \par BLLE arterial dopplers 1/5/23: CONCLUSIONS:\par 1. Mild to moderate above knee PAD is noted throughout bilateral lower\par extremities with multiphasic waveforms.\par 2. Appears to have significant below knee disease given monophasic below\par knee waveforms.\par 3. No prior exam is available for comparison.\par \par Carotid u/s 11/30/22: CONCLUSIONS:\par 1. Vertebral: antegrade flow bilaterally.\par 2. Mild atherosclerosis seen bilaterally.\par 3. Compared to prior on 10/12/2021- No significant changes.\par \par Abd u/s 11/30/22: CONCLUSIONS:\par 1. Proximal abdominal aortic measurements are suboptimal. No AAA from midbifucation\par noted.\par 2. Mild heterogenous atherosclerosis seen throughout abdominal aorta. Aracelis Patel [facility]  skill demonstration/written material/verbal instruction

## 2023-07-05 ENCOUNTER — FORM ENCOUNTER (OUTPATIENT)
Age: 83
End: 2023-07-05

## 2023-07-10 ENCOUNTER — APPOINTMENT (OUTPATIENT)
Dept: ORTHOPEDIC SURGERY | Facility: CLINIC | Age: 83
End: 2023-07-10
Payer: MEDICARE

## 2023-07-10 VITALS — BODY MASS INDEX: 25.76 KG/M2 | HEIGHT: 68 IN | WEIGHT: 170 LBS

## 2023-07-10 PROCEDURE — 99214 OFFICE O/P EST MOD 30 MIN: CPT

## 2023-07-10 PROCEDURE — 95864 NEEDLE EMG 4 EXTREMITIES: CPT | Mod: 1L

## 2023-07-10 NOTE — HISTORY OF PRESENT ILLNESS
[Lower back] : lower back [Gradual] : gradual [7] : 7 [6] : 6 [Constant] : constant [Nothing helps with pain getting better] : Nothing helps with pain getting better [Sitting] : sitting [Retired] : Work status: retired [de-identified] : Follow up lumbar spine. States he has pain radiating down his left leg, has bilateral foot numbness. States his leg feels weak and ran on him. States he has difficulty walking. States he feels increased pain with standing for any duration of time. States he no longer does yard work or house work due to feeling increased pain. Admits to taking Tylenol for pain with no relief. [] : no

## 2023-07-10 NOTE — ASSESSMENT
[FreeTextEntry1] : Patient given prescription for EMG/NCS, follow up after study is completed to discuss results. \par \par Multilevel lumbar spondylosis \par Chronic L2 compression fracture \par Retrolisthesis L2-3 \par Moderate to severe stenosis L2-3 \par Moderate stenosis L4-5 \par \par Patient with persistent symptoms refractory to non operative care. Patient is seeking surgical options. Patient is a candidate for surgery after failing extensive non operative care.\par

## 2023-07-18 ENCOUNTER — APPOINTMENT (OUTPATIENT)
Dept: ORTHOPEDIC SURGERY | Facility: HOSPITAL | Age: 83
End: 2023-07-18

## 2023-08-15 ENCOUNTER — APPOINTMENT (OUTPATIENT)
Dept: NEUROLOGY | Facility: CLINIC | Age: 83
End: 2023-08-15
Payer: MEDICARE

## 2023-08-15 PROCEDURE — 95911 NRV CNDJ TEST 9-10 STUDIES: CPT

## 2023-08-15 PROCEDURE — 95886 MUSC TEST DONE W/N TEST COMP: CPT

## 2023-08-16 ENCOUNTER — OFFICE (OUTPATIENT)
Dept: URBAN - METROPOLITAN AREA CLINIC 97 | Facility: CLINIC | Age: 83
Setting detail: OPHTHALMOLOGY
End: 2023-08-16
Payer: COMMERCIAL

## 2023-08-16 ENCOUNTER — RX ONLY (RX ONLY)
Age: 83
End: 2023-08-16

## 2023-08-16 DIAGNOSIS — H40.1112: ICD-10-CM

## 2023-08-16 DIAGNOSIS — H40.1121: ICD-10-CM

## 2023-08-16 PROCEDURE — 99213 OFFICE O/P EST LOW 20 MIN: CPT | Performed by: OPHTHALMOLOGY

## 2023-08-16 ASSESSMENT — CONFRONTATIONAL VISUAL FIELD TEST (CVF)
OS_FINDINGS: FULL
OD_FINDINGS: FULL

## 2023-08-16 ASSESSMENT — AXIALLENGTH_DERIVED
OS_AL: 23.5919
OD_AL: 23.3087
OD_AL: 23.3087
OD_AL: 23.5975

## 2023-08-16 ASSESSMENT — KERATOMETRY
OD_K2POWER_DIOPTERS: 44.50
OD_AXISANGLE_DEGREES: 178
OS_K1POWER_DIOPTERS: 43.00
OS_K2POWER_DIOPTERS: 44.00
OS_AXISANGLE_DEGREES: 154
OD_K1POWER_DIOPTERS: 43.00
METHOD_AUTO_MANUAL: AUTO

## 2023-08-16 ASSESSMENT — VISUAL ACUITY
OS_BCVA: 20/70-1
OD_BCVA: 20/40-2

## 2023-08-16 ASSESSMENT — REFRACTION_MANIFEST
OD_CYLINDER: +1.50
OD_ADD: +3.00
OS_SPHERE: PLANO
OS_SPHERE: -0.50
OS_VA1: 20/40-2
OU_VA: 20/25-2
OU_VA: 20/40+2
OS_VA1: 20/25-2
OD_VA1: 20/60+2
OS_ADD: +2.75
OD_ADD: +2.75
OD_CYLINDER: +2.50
OS_AXIS: 20
OS_CYLINDER: +1.00
OD_VA2: 20/25(J1)
OD_VA1: 20/50+2
OD_SPHERE: -1.00
OS_VA2: 20/25(J1)
OD_VA2: 20/20(J1+)
OS_VA2: 20/20(J1+)
OS_ADD: +3.00
OS_AXIS: 170
OS_CYLINDER: +1.25
OD_AXIS: 005
OD_AXIS: 020
OD_SPHERE: -0.75

## 2023-08-16 ASSESSMENT — SUPERFICIAL PUNCTATE KERATITIS (SPK)
OS_SPK: 2+
OD_SPK: 2+

## 2023-08-16 ASSESSMENT — REFRACTION_CURRENTRX
OS_SPHERE: +0.75
OS_AXIS: 180
OD_VPRISM_DIRECTION: SV
OS_SPHERE: +2.75
OD_AXIS: 003
OS_VPRISM_DIRECTION: SV
OD_SPHERE: +2.75
OD_OVR_VA: 20/
OS_OVR_VA: 20/
OD_VPRISM_DIRECTION: SV
OS_OVR_VA: 20/
OD_SPHERE: +2.50
OD_CYLINDER: +1.75
OD_CYLINDER: +1.50
OS_VPRISM_DIRECTION: SV
OS_CYLINDER: +1.00
OD_AXIS: 009
OD_OVR_VA: 20/
OD_SPHERE: -0.50
OS_AXIS: 180
OD_OVR_VA: 20/
OS_VPRISM_DIRECTION: SV
OS_CYLINDER: +1.50
OS_SPHERE: +2.50
OS_OVR_VA: 20/
OD_VPRISM_DIRECTION: SV

## 2023-08-16 ASSESSMENT — TONOMETRY
OD_IOP_MMHG: 17
OS_IOP_MMHG: 16

## 2023-08-16 ASSESSMENT — REFRACTION_AUTOREFRACTION
OD_AXIS: 007
OS_CYLINDER: +1.25
OS_AXIS: 171
OS_SPHERE: PLANO
OD_CYLINDER: +2.50
OD_SPHERE: -0.75

## 2023-08-16 ASSESSMENT — PACHYMETRY
OS_CT_CORRECTION: 1
OS_CT_UM: 526

## 2023-08-16 ASSESSMENT — SPHEQUIV_DERIVED
OD_SPHEQUIV: 0.5
OD_SPHEQUIV: -0.25
OS_SPHEQUIV: 0
OD_SPHEQUIV: 0.5

## 2023-08-16 ASSESSMENT — LID POSITION - DERMATOCHALASIS
OS_DERMATOCHALASIS: LLL LUL 2+
OD_DERMATOCHALASIS: RLL RUL 2+

## 2023-08-21 ENCOUNTER — APPOINTMENT (OUTPATIENT)
Dept: ORTHOPEDIC SURGERY | Facility: CLINIC | Age: 83
End: 2023-08-21
Payer: MEDICARE

## 2023-08-21 VITALS — BODY MASS INDEX: 25.76 KG/M2 | WEIGHT: 170 LBS | HEIGHT: 68 IN

## 2023-08-21 PROCEDURE — 99213 OFFICE O/P EST LOW 20 MIN: CPT

## 2023-08-21 NOTE — REASON FOR VISIT
"Chief Complaint  ADHD    Subjective    History of Present Illness      Patient presents to [facility] for   History of Present Illness  ADHD/Mood HPI    Visit for:  follow-up. Most recent visit was 2 months ago.  Interim changes to follow up on today: no change in medication  Work/School Performance:  going well  Cognitive:  able to focus    Behavior  Hyperactivity: is not hyperactive  Impulsivity: no impulsivity and no unsafe behavior  Tasking: able to initiate tasks, able to complete tasks and able to mult-task    Social  ADHD social/impulsive symptoms:  not impatient, does not blurt out inappropriate comments and no excessive talking    Behavioral health  Behavior: no concerns  Emotional coping: demonstrates feelings of no concerns       Review of Systems    I have reviewed and agree with the HPI information as above.  Kamala Umaña, APRN     Objective   Vital Signs:   /81   Pulse 114   Resp 20   Ht 157.5 cm (62\")   Wt 115 kg (254 lb)   BMI 46.46 kg/m²     Class 3 Severe Obesity (BMI >=40). Obesity-related health conditions include the following: none. Obesity is improving with treatment. BMI is is above average; BMI management plan is completed. We discussed portion control, increasing exercise and pharmacologic options including Wegovy.      Physical Exam  Vitals and nursing note reviewed.   Constitutional:       Appearance: Normal appearance. She is well-developed.   HENT:      Head: Normocephalic and atraumatic.      Right Ear: Tympanic membrane, ear canal and external ear normal.      Left Ear: Tympanic membrane, ear canal and external ear normal.      Nose: Nose normal. No septal deviation, nasal tenderness or congestion.      Mouth/Throat:      Lips: Pink. No lesions.      Mouth: Mucous membranes are moist. No oral lesions.      Dentition: Normal dentition.      Pharynx: Oropharynx is clear. No pharyngeal swelling, oropharyngeal exudate or posterior " oropharyngeal erythema.   Eyes:      General: Lids are normal. Vision grossly intact. No scleral icterus.        Right eye: No discharge.         Left eye: No discharge.      Extraocular Movements: Extraocular movements intact.      Conjunctiva/sclera: Conjunctivae normal.      Right eye: Right conjunctiva is not injected.      Left eye: Left conjunctiva is not injected.      Pupils: Pupils are equal, round, and reactive to light.   Neck:      Thyroid: No thyroid mass.      Trachea: Trachea normal.   Cardiovascular:      Rate and Rhythm: Normal rate and regular rhythm.      Heart sounds: Normal heart sounds. No murmur heard.    No gallop.   Pulmonary:      Effort: Pulmonary effort is normal.      Breath sounds: Normal breath sounds and air entry. No wheezing, rhonchi or rales.   Musculoskeletal:         General: No tenderness or deformity. Normal range of motion.      Cervical back: Full passive range of motion without pain, normal range of motion and neck supple.      Thoracic back: Normal.      Right lower leg: No edema.      Left lower leg: No edema.   Skin:     General: Skin is warm and dry.      Coloration: Skin is not jaundiced.      Findings: No rash.   Neurological:      Mental Status: She is alert and oriented to person, place, and time.      Sensory: Sensation is intact.      Motor: Motor function is intact.      Coordination: Coordination is intact.      Gait: Gait is intact.      Deep Tendon Reflexes: Reflexes are normal and symmetric.   Psychiatric:         Mood and Affect: Mood and affect normal.         Behavior: Behavior normal.         Judgment: Judgment normal.          ULISES-7:      PHQ-2 Depression Screening  Little interest or pleasure in doing things? 0-->not at all   Feeling down, depressed, or hopeless? 0-->not at all   PHQ-2 Total Score 0     PHQ-9 Depression Screening  Little interest or pleasure in doing things? 0-->not at all   Feeling down, depressed, or hopeless? 0-->not at all   Trouble  falling or staying asleep, or sleeping too much?     Feeling tired or having little energy?     Poor appetite or overeating?     Feeling bad about yourself - or that you are a failure or have let yourself or your family down?     Trouble concentrating on things, such as reading the newspaper or watching television?     Moving or speaking so slowly that other people could have noticed? Or the opposite - being so fidgety or restless that you have been moving around a lot more than usual?     Thoughts that you would be better off dead, or of hurting yourself in some way?     PHQ-9 Total Score 0   If you checked off any problems, how difficult have these problems made it for you to do your work, take care of things at home, or get along with other people?        Result Review  Data Reviewed:                   Assessment and Plan      Diagnoses and all orders for this visit:    1. Attention deficit hyperactivity disorder (ADHD), combined type (Primary)    2. Class 3 severe obesity due to excess calories with serious comorbidity and body mass index (BMI) of 45.0 to 49.9 in adult (HCC)  -     Semaglutide-Weight Management (Wegovy) 1.7 MG/0.75ML solution auto-injector; Inject 0.75 mL under the skin into the appropriate area as directed 1 (One) Time Per Week for 90 days.  Dispense: 3 mL; Refill: 2    3. Other insomnia  -     QUEtiapine (SEROquel) 50 MG tablet; Take 1 tablet by mouth Every Night.  Dispense: 30 tablet; Refill: 2    4. Medication monitoring encounter  -     Urine Drug Screen - Urine, Clean Catch; Future    5. Mixed anxiety depressive disorder  -     ARIPiprazole (Abilify) 5 MG tablet; Take 1 tablet by mouth Daily.  Dispense: 30 tablet; Refill: 2  -     vilazodone (Viibryd) 20 MG tablet tablet; Take 1 tablet by mouth Daily.  Dispense: 30 tablet; Refill: 2      Patient is here today for a medication follow-up for her ADHD, mood, and sleep.  She states that she is doing well on her ADHD medication.  Her Abilify  and Viibryd are working well and does not need any changes at this time.  Sleeping well with Seroquel.  She has been taking wegovy from another provider and wishes to try to get that approved through us.         Follow Up   Return in about 3 months (around 6/24/2023) for ADHD follow up.  Patient was given instructions and counseling regarding her condition or for health maintenance advice. Please see specific information pulled into the AVS if appropriate.        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

## 2023-08-21 NOTE — HISTORY OF PRESENT ILLNESS
[de-identified] : Follow up lumbar spine and EMG Results Pt reports little change since last visit  Reports taking Tylenol 4x/day with short term relief- back pain continues.

## 2023-08-21 NOTE — DISCUSSION/SUMMARY
[de-identified] : Surgical risks reviewed. I discussed non operative and operative treatment options in great detail with the patient. I discussed treatment options, including but not limited to,  1. Non operative treatment - rest, NSAID, physical therapy etc.  2. Interventional treatment - injections etc.  3. Surgical treatment - laminectomy L2-3, L4-5    Patient wants to proceed with laminectomy after failing nonoperative care. I had a lengthy discussion with the patient about the rational and goal of the surgery as well as expected outcome. I encouraged patient to seek a second opinion regarding surgery. I explained postoperative rehabilitation and recovery process to the patient. I discussed risks, benefits and alternatives of the procedure in detail with the patient. I counseled patient about risks and possible complications, including but not limited to, infection, bleeding, nerve injury, arterial and venous injury, single or multiple muscle group weakness, dural tear, CSF leak, pseudomeningocele, arachnoiditis, CSF fistula, meningitis, discitis, osteomyelitis, epidural hematoma, DVT, PE, CRPS, MI, paralysis, spinous process fracture, recurrent/residual stenosis, instrumentation malposition, adjacent segment disease, persistent symptoms, and risks of anesthesia. I explained to the patient that the surgical outcome is unpredictable and there is no guarantee that the symptoms will resolve after the surgery. The patient understands and wishes to proceed. All questions were answered and patient was given information to review.

## 2023-08-21 NOTE — ASSESSMENT
[FreeTextEntry1] : Surgery - laminectomy L2-3, L4-5  EMG - negative   Multilevel lumbar spondylosis  Chronic L2 compression fracture  Retrolisthesis L2-3  Moderate to severe stenosis L2-3  Moderate stenosis L4-5   Patient with persistent symptoms refractory to non operative care. Patient is seeking surgical options. Patient is a candidate for surgery after failing extensive non operative care.

## 2023-09-20 ENCOUNTER — RESULT CHARGE (OUTPATIENT)
Age: 83
End: 2023-09-20

## 2023-09-21 ENCOUNTER — APPOINTMENT (OUTPATIENT)
Dept: CARDIOLOGY | Facility: CLINIC | Age: 83
End: 2023-09-21
Payer: MEDICARE

## 2023-09-21 VITALS
DIASTOLIC BLOOD PRESSURE: 60 MMHG | SYSTOLIC BLOOD PRESSURE: 122 MMHG | OXYGEN SATURATION: 97 % | BODY MASS INDEX: 23.04 KG/M2 | HEART RATE: 69 BPM | HEIGHT: 68 IN | WEIGHT: 152 LBS

## 2023-09-21 PROCEDURE — 99215 OFFICE O/P EST HI 40 MIN: CPT

## 2023-09-28 ENCOUNTER — APPOINTMENT (OUTPATIENT)
Dept: CARDIOLOGY | Facility: CLINIC | Age: 83
End: 2023-09-28
Payer: MEDICARE

## 2023-09-28 VITALS
SYSTOLIC BLOOD PRESSURE: 116 MMHG | HEART RATE: 64 BPM | DIASTOLIC BLOOD PRESSURE: 66 MMHG | WEIGHT: 152 LBS | HEIGHT: 68 IN | OXYGEN SATURATION: 97 % | BODY MASS INDEX: 23.04 KG/M2

## 2023-09-28 PROCEDURE — 99214 OFFICE O/P EST MOD 30 MIN: CPT

## 2023-09-29 ENCOUNTER — APPOINTMENT (OUTPATIENT)
Dept: ORTHOPEDIC SURGERY | Facility: HOSPITAL | Age: 83
End: 2023-09-29

## 2023-10-09 ENCOUNTER — APPOINTMENT (OUTPATIENT)
Dept: CARDIOLOGY | Facility: CLINIC | Age: 83
End: 2023-10-09
Payer: MEDICARE

## 2023-10-09 PROCEDURE — 78452 HT MUSCLE IMAGE SPECT MULT: CPT

## 2023-10-09 PROCEDURE — A9502: CPT

## 2023-10-09 PROCEDURE — 93015 CV STRESS TEST SUPVJ I&R: CPT

## 2023-10-30 ENCOUNTER — APPOINTMENT (OUTPATIENT)
Dept: CARDIOLOGY | Facility: CLINIC | Age: 83
End: 2023-10-30
Payer: MEDICARE

## 2023-10-30 VITALS
OXYGEN SATURATION: 96 % | DIASTOLIC BLOOD PRESSURE: 62 MMHG | BODY MASS INDEX: 24.4 KG/M2 | SYSTOLIC BLOOD PRESSURE: 138 MMHG | WEIGHT: 161 LBS | HEIGHT: 68 IN | HEART RATE: 82 BPM

## 2023-10-30 PROCEDURE — 93280 PM DEVICE PROGR EVAL DUAL: CPT

## 2023-11-06 ENCOUNTER — APPOINTMENT (OUTPATIENT)
Dept: CARDIOLOGY | Facility: CLINIC | Age: 83
End: 2023-11-06
Payer: MEDICARE

## 2023-11-06 VITALS
WEIGHT: 160 LBS | OXYGEN SATURATION: 98 % | HEIGHT: 68 IN | BODY MASS INDEX: 24.25 KG/M2 | SYSTOLIC BLOOD PRESSURE: 148 MMHG | DIASTOLIC BLOOD PRESSURE: 60 MMHG | HEART RATE: 62 BPM

## 2023-11-06 DIAGNOSIS — Z01.810 ENCOUNTER FOR PREPROCEDURAL CARDIOVASCULAR EXAMINATION: ICD-10-CM

## 2023-11-06 DIAGNOSIS — Z98.890 OTHER SPECIFIED POSTPROCEDURAL STATES: ICD-10-CM

## 2023-11-06 PROCEDURE — 99215 OFFICE O/P EST HI 40 MIN: CPT

## 2023-11-06 RX ORDER — METOPROLOL SUCCINATE 25 MG/1
25 TABLET, EXTENDED RELEASE ORAL DAILY
Qty: 45 | Refills: 2 | Status: ACTIVE | COMMUNITY
Start: 1900-01-01 | End: 1900-01-01

## 2023-11-28 PROBLEM — I35.0 NON-RHEUMATIC AORTIC STENOSIS: Status: ACTIVE | Noted: 2017-11-02

## 2023-11-28 PROBLEM — I34.0 NONRHEUMATIC MITRAL VALVE REGURGITATION: Status: ACTIVE | Noted: 2017-11-02

## 2023-11-30 LAB — HBA1C MFR BLD HPLC: 5.7

## 2023-12-01 ENCOUNTER — APPOINTMENT (OUTPATIENT)
Dept: ORTHOPEDIC SURGERY | Facility: HOSPITAL | Age: 83
End: 2023-12-01
Payer: MEDICARE

## 2023-12-01 PROCEDURE — 63047 LAM FACETEC & FORAMOT LUMBAR: CPT

## 2023-12-01 PROCEDURE — 63048 LAM FACETEC &FORAMOT EA ADDL: CPT | Mod: AS

## 2023-12-01 PROCEDURE — 63048 LAM FACETEC &FORAMOT EA ADDL: CPT

## 2023-12-01 PROCEDURE — 63047 LAM FACETEC & FORAMOT LUMBAR: CPT | Mod: AS

## 2023-12-05 ENCOUNTER — APPOINTMENT (OUTPATIENT)
Dept: CARDIOLOGY | Facility: CLINIC | Age: 83
End: 2023-12-05

## 2023-12-05 DIAGNOSIS — I35.0 NONRHEUMATIC AORTIC (VALVE) STENOSIS: ICD-10-CM

## 2023-12-05 DIAGNOSIS — I34.0 NONRHEUMATIC MITRAL (VALVE) INSUFFICIENCY: ICD-10-CM

## 2023-12-18 ENCOUNTER — APPOINTMENT (OUTPATIENT)
Dept: ORTHOPEDIC SURGERY | Facility: CLINIC | Age: 83
End: 2023-12-18
Payer: MEDICARE

## 2023-12-18 DIAGNOSIS — M51.37 OTHER INTERVERTEBRAL DISC DEGENERATION, LUMBOSACRAL REGION: ICD-10-CM

## 2023-12-18 DIAGNOSIS — M41.56 OTHER SECONDARY SCOLIOSIS, LUMBAR REGION: ICD-10-CM

## 2023-12-18 DIAGNOSIS — M43.16 SPONDYLOLISTHESIS, LUMBAR REGION: ICD-10-CM

## 2023-12-18 DIAGNOSIS — M48.061 SPINAL STENOSIS, LUMBAR REGION WITHOUT NEUROGENIC CLAUDICATION: ICD-10-CM

## 2023-12-18 DIAGNOSIS — M62.830 MUSCLE SPASM OF BACK: ICD-10-CM

## 2023-12-18 DIAGNOSIS — M47.817 SPONDYLOSIS W/OUT MYELOPATHY OR RADICULOPATHY, LUMBOSACRAL REGION: ICD-10-CM

## 2023-12-18 DIAGNOSIS — M12.812 UNSPECIFIED ROTATOR CUFF TEAR OR RUPTURE OF LEFT SHOULDER, NOT SPECIFIED AS TRAUMATIC: ICD-10-CM

## 2023-12-18 DIAGNOSIS — M75.102 UNSPECIFIED ROTATOR CUFF TEAR OR RUPTURE OF LEFT SHOULDER, NOT SPECIFIED AS TRAUMATIC: ICD-10-CM

## 2023-12-18 DIAGNOSIS — M75.42 IMPINGEMENT SYNDROME OF LEFT SHOULDER: ICD-10-CM

## 2023-12-18 DIAGNOSIS — M75.52 BURSITIS OF LEFT SHOULDER: ICD-10-CM

## 2023-12-18 PROCEDURE — 20610 DRAIN/INJ JOINT/BURSA W/O US: CPT | Mod: LT

## 2023-12-18 PROCEDURE — 99214 OFFICE O/P EST MOD 30 MIN: CPT | Mod: 25

## 2023-12-18 NOTE — ASSESSMENT
[FreeTextEntry1] : EMG - negative  Multilevel lumbar spondylosis Chronic L2 compression fracture Retrolisthesis L2-3 Moderate to severe stenosis L2-3 Moderate stenosis L4-5  82 y/o male presents today for follow up on his low back, S/P Laminectomy L2-3, 4-5 on 12/1/23. Patient progressing as expected. However, patient also having significant pain in his left shoulder. Discussed with patient that he may benefit from a CSI into the shoulder at this time.   - Patient given CSI into L shoulder today secondary to pain and inflammation. Patient tolerated the procedure well. Advised patient to ice the area well for the next 24 hours.    - Patient will continue HEP as detailed in home exercise booklet given in office. Emphasized daily stretching and strengthening.   - Recommend NSAIDs PRN - Recommend heating pad use to decrease muscle spasm - Discussed the importance of home exercises, including but not limited to hamstring stretching and core strengthening   Patient was educated on their diagnosis today. All questions answered and patient expressed understanding.  Follow up in 6 weeks

## 2023-12-18 NOTE — HISTORY OF PRESENT ILLNESS
[de-identified] : S/P Laminectomy L2-3, 4-5 on 12/1/23. Patient denies fevers, chills, SOB. Patient states he feels some soreness and tightness. Patient admits to taking Methocarbamol for pain.   Today's Pain 6/10

## 2023-12-21 ENCOUNTER — APPOINTMENT (OUTPATIENT)
Dept: CARDIOLOGY | Facility: CLINIC | Age: 83
End: 2023-12-21

## 2024-01-16 ENCOUNTER — APPOINTMENT (OUTPATIENT)
Dept: CARDIOLOGY | Facility: CLINIC | Age: 84
End: 2024-01-16
Payer: MEDICARE

## 2024-01-16 VITALS
SYSTOLIC BLOOD PRESSURE: 156 MMHG | HEIGHT: 68 IN | BODY MASS INDEX: 23.34 KG/M2 | WEIGHT: 154 LBS | HEART RATE: 76 BPM | OXYGEN SATURATION: 98 % | DIASTOLIC BLOOD PRESSURE: 70 MMHG

## 2024-01-16 DIAGNOSIS — Z95.1 PRESENCE OF AORTOCORONARY BYPASS GRAFT: ICD-10-CM

## 2024-01-16 DIAGNOSIS — Z95.2 PRESENCE OF PROSTHETIC HEART VALVE: ICD-10-CM

## 2024-01-16 DIAGNOSIS — I48.0 PAROXYSMAL ATRIAL FIBRILLATION: ICD-10-CM

## 2024-01-16 PROCEDURE — 99215 OFFICE O/P EST HI 40 MIN: CPT

## 2024-01-16 PROCEDURE — 93880 EXTRACRANIAL BILAT STUDY: CPT

## 2024-01-16 PROCEDURE — 93306 TTE W/DOPPLER COMPLETE: CPT

## 2024-01-16 PROCEDURE — 93979 VASCULAR STUDY: CPT

## 2024-01-16 NOTE — PHYSICAL EXAM
[Well Developed] : well developed [Well Nourished] : well nourished [No Acute Distress] : no acute distress [Normal Venous Pressure] : normal venous pressure [No Carotid Bruit] : no carotid bruit [Normal S1, S2] : normal S1, S2 [No Rub] : no rub [No Gallop] : no gallop [Clear Lung Fields] : clear lung fields [Good Air Entry] : good air entry [No Respiratory Distress] : no respiratory distress  [Soft] : abdomen soft [Non Tender] : non-tender [No Masses/organomegaly] : no masses/organomegaly [Normal Bowel Sounds] : normal bowel sounds [Normal Gait] : normal gait [No Edema] : no edema [No Cyanosis] : no cyanosis [No Clubbing] : no clubbing [No Varicosities] : no varicosities [No Rash] : no rash [No Skin Lesions] : no skin lesions [Moves all extremities] : moves all extremities [No Focal Deficits] : no focal deficits [Normal Speech] : normal speech [Alert and Oriented] : alert and oriented [Normal memory] : normal memory [General Appearance - Well Developed] : well developed [Normal Appearance] : normal appearance [Well Groomed] : well groomed [General Appearance - Well Nourished] : well nourished [No Deformities] : no deformities [General Appearance - In No Acute Distress] : no acute distress [Normal Conjunctiva] : the conjunctiva exhibited no abnormalities [Eyelids - No Xanthelasma] : the eyelids demonstrated no xanthelasmas [Normal Oral Mucosa] : normal oral mucosa [No Oral Pallor] : no oral pallor [No Oral Cyanosis] : no oral cyanosis [Normal Jugular Venous A Waves Present] : normal jugular venous A waves present [Normal Jugular Venous V Waves Present] : normal jugular venous V waves present [No Jugular Venous Paiz A Waves] : no jugular venous paiz A waves [Respiration, Rhythm And Depth] : normal respiratory rhythm and effort [Exaggerated Use Of Accessory Muscles For Inspiration] : no accessory muscle use [Auscultation Breath Sounds / Voice Sounds] : lungs were clear to auscultation bilaterally [Heart Rate And Rhythm] : heart rate and rhythm were normal [Heart Sounds] : normal S1 and S2 [Systolic grade ___/6] : A grade [unfilled]/6 systolic murmur was heard. [Abdomen Soft] : soft [Abdomen Tenderness] : non-tender [Abdomen Mass (___ Cm)] : no abdominal mass palpated [Abnormal Walk] : normal gait [Gait - Sufficient For Exercise Testing] : the gait was sufficient for exercise testing [Nail Clubbing] : no clubbing of the fingernails [Cyanosis, Localized] : no localized cyanosis [Petechial Hemorrhages (___cm)] : no petechial hemorrhages [Skin Color & Pigmentation] : normal skin color and pigmentation [] : no rash [No Venous Stasis] : no venous stasis [Skin Lesions] : no skin lesions [No Skin Ulcers] : no skin ulcer [No Xanthoma] : no  xanthoma was observed [Oriented To Time, Place, And Person] : oriented to person, place, and time [Affect] : the affect was normal [Mood] : the mood was normal [No Anxiety] : not feeling anxious [de-identified] : 2/6 KRYSTINA, nl AVR click [FreeTextEntry1] : 2/6 KRYSTINA, nl AVR click, right upper chest PPM

## 2024-01-16 NOTE — DISCUSSION/SUMMARY
[FreeTextEntry1] : Bill is a 83-year-old male with medical history detailed above and active medical issues including:   - Limited exercise, abnormal Myoview stress test with small basal lateral infarct without ischemia.  Stable on medical therapy for moderate CAD with patent  LIMA LAD graft cardiac catheterization Feb 2023  - History of severe aortic stenosis, abnormal exercise stress echo, status post single-vessel CABG, bioprosthetic AVR, aortic root repair in April 2013.  - Brief PAF CHADS VASc score 3, history of subdural hematoma, moderate risk for oral anticoagulation,  electrophysiologist Dr. Saad Reyes, no AC unless prolonged PAF.  - Postop heart block, [employer] Kirit dual chamber permanent pacemaker implant in April 2013.  - Hypertension average resting BPs at guideline goal on Toprol, losartan  - History of Babesiosis/thrombocytopenia following with hematology   - Dyslipidemia on pravastatin well-tolerated  - Orthopedic back surgery Dr. Adenike Sanders [facility].  - Prior tobacco abuse.  Advised patient to follow active lifestyle with regular cardiovascular exercise. Patient educated on lifestyle and diet modification with low sodium low fat diet and avoidance of excessive alcohol. Patient is aware to call with any symptoms or concerns.   Cardiology follow-up 6 months.  Current cardiac medications remain unchanged. Repeat labs will be ordered with PMD.  Bill will follow up with Dr. Bennett for primary care.   Total time spent 45 minutes, reviewing of test results, chart information, patient discussion, physical exam and completion of chart documentation.

## 2024-01-16 NOTE — PROCEDURE
[No] : not [NSR] : normal sinus rhythm [Pacemaker] : pacemaker [DDD] : DDD [Voltage: ___ volts] : Voltage was [unfilled] volts [Lead Imp:  ___ohms] : lead impedance was [unfilled] ohms [Sensing Amplitude ___mv] : sensing amplitude was [unfilled] mv [___V @] : [unfilled] V [___ ms] : [unfilled] ms [None] : none [Threshold Testing Performed] : Threshold testing was not performed [de-identified] : RAZA  [de-identified] : [employer]  [de-identified] : [id number] [de-identified] : 4-24[birth year] [de-identified] : 62 [de-identified] : 8.2-9.3 years.  [de-identified] : AP 30%\par   <1 %\par  1 Episodes AF < 10s and one episode of Aflutter vs SVT <10 s\par  \par  He has history of this, however given prior history of syncopal fall and SDH, EP advised no AC unless prolonged AF. \par  \par  Continue 3 months follow up. \par

## 2024-01-16 NOTE — REASON FOR VISIT
[Other: ____] : [unfilled] [Follow-up Device Check] : follow-up device check visit [FreeTextEntry1] : Bill is a 83-year-old male with history of aortic stenosis, ascending aortic aneurysm, status post single-vessel CABG, bioprosthetic AVR, and ascending aortic repair at [facility] in April 2013, postop heart block, [employer] permanent pacemaker implant in April 2013 at [place], hypertension and dyslipidemia, Babisiosis, thrombocytopenia, multiple platelet transfusion, hematology Dr Agarwal, syncopal fall head trauma SDH admit [facility] August 2014, Prostate CA radiation therapy, small lateral infarct Myoview stress test Oct 2023, patent JOHNSON LAD with residual LCx medical therapy catheterization Feb 2023  Cardiovascular review of symptoms is negative for exertional chest pain, dyspnea, palpitations, dizziness or syncope. No PND or orthopnea leg edema. No bleeding or black stool.  Patient is exercising 20 minutes [employer] without exertional symptoms.  Patient is compliant with his diet medications.  Echocardiogram Jan 2024 LVEF 55%, normal bio AVR, mild-moderate TR, mild MR, pacer lead seen in RV, diastolic dysfunction, normal RVSP  Carotid and abdominal ultrasound Jan 2024, mild nonobstructive plaque, normal abdominal aortic size.   Cardiac catheterization Feb 2023 patent JOHNSON LAD, OM1 80%, medical therapy  Echocardiogram Nov 2023 LVEF 55%, mild MR, normal bio AVR, mild-moderate TR, normal RVSP  Lexiscan Myoview stress test Jan 2023, LVEF 64%, moderate apical and apical lateral ischemic defect, no chest pain, nonischemic EKG response, baseline atrial pacing PRWP  [employer] permanent pacemaker check  implanted April 2013, dual-chamber A-pacing 36% V-pacing less than 1%, battery greater than 7 years, no episodes, follow-up 3 months  Echocardiogram Oct 2020, LVEF 55-60%, normal bioprosthetic AVR, mild MR and TR, mild pulmonary hypertension  Echocardiogram July 2019, LVEF 60%, normal bioprosthetic AVR, mild diastolic dysfunction, mild MR, normal RVSP   Lexascan Myoview stress July 2017, LVEF 54% diaphragm attenuation, no ischemia nonischemic EKG response, baseline sinus rhythm PRWP, NSST, no angina  2-D echo April 2017, LVEF 55%, function, normal bioprosthetic AVR, mild/moderate MR, normal aortic valve gradient, mild TR, PASP 35  Carotid and abdominal ultrasound April 2017, nonobstructive, proximal aorta 2.8 cm.  Bill is a 82-year-old male with medical history detailed above and active medical issues including:  - Limited exercise, abnormal Myoview stress test with moderate apical lateral ischemia. Stable on medical therapy for moderate CAD with patent grafts cardiac catheterization Feb 2023  - History of severe aortic stenosis, abnormal exercise stress echo, status post single-vessel CABG, bioprosthetic AVR, aortic root repair in April 2013.  - Brief PAF CHADS VASc score 3, history of subdural hematoma, moderate risk for oral anticoagulation, electrophysiologist Dr. Saad Reyes, no AC unless prolonged PAF.  - Postop heart block, [employer]irit dual chamber permanent pacemaker implant in April 2013.  - Hypertension at goal <130/80, follow up home BP daily over one week.  - History of Babesiosis/thrombocytopenia following with hematology  - Dyslipidemia intolerant to statins.  - Prior tobacco abuse.  Advised patient to follow active lifestyle with regular cardiovascular exercise. Patient educated on lifestyle and diet modification with low sodium low fat diet and avoidance of excessive alcohol. Patient is aware to call with any symptoms or concerns.  Cardiology follow-up 6 months with echocardiogram and Doppler studies. Current cardiac medications remain unchanged. Repeat labs will be ordered with PMD.  Bill will follow up with Dr. Bennett for primary care.   Total time spent 45 minutes, reviewing of test results, chart information, patient discussion, physical exam and completion of chart documentation.

## 2024-01-29 ENCOUNTER — APPOINTMENT (OUTPATIENT)
Dept: ORTHOPEDIC SURGERY | Facility: CLINIC | Age: 84
End: 2024-01-29
Payer: MEDICARE

## 2024-01-29 PROCEDURE — 99024 POSTOP FOLLOW-UP VISIT: CPT

## 2024-01-29 RX ORDER — MELOXICAM 15 MG/1
15 TABLET ORAL DAILY
Qty: 30 | Refills: 1 | Status: ACTIVE | COMMUNITY
Start: 2024-01-29 | End: 1900-01-01

## 2024-01-29 NOTE — ASSESSMENT
[FreeTextEntry1] : EMG - negative  PREOP MRI of L-Spine was reviewed today and is as follows:  Multilevel lumbar spondylosis Chronic L2 compression fracture Retrolisthesis L2-3 Moderate to severe stenosis L2-3 Moderate stenosis L4-5  82 y/o male presents today for follow up on his low back, S/P Laminectomy L2-3, 4-5 on 12/1/23. Patient progressing as expected. Patient dale some stiffness and arthritic pain. Recommend medication management and PT at this time.   - Recommend physical therapy to regain range of motion, strengthening and symptomatic improvement. Prescription given in office today.   - Patient given prescription for Meloxicam 15mg today. Advised patient to take once a day with food and to discontinue usage of other NSAIDs concurrently. Educated patient on potential adverse effects of long term NSAID use, including development of gastric ulcers and renal injury.  - Recommend NSAIDs PRN - Recommend heating pad use to decrease muscle spasm - Discussed the importance of home exercises, including but not limited to hamstring stretching and core strengthening   Patient was educated on their diagnosis today. All questions answered and patient expressed understanding.  Follow up in 2 months

## 2024-01-29 NOTE — HISTORY OF PRESENT ILLNESS
[de-identified] : S/P Laminectomy L2-3, 4-5 on 12/1/23.  Patient states he feels pain with prolonged sitting and standing. Patient admits to taking Methocarbamol for pain.  Today's Pain 5/10

## 2024-02-12 ENCOUNTER — OFFICE (OUTPATIENT)
Dept: URBAN - METROPOLITAN AREA CLINIC 97 | Facility: CLINIC | Age: 84
Setting detail: OPHTHALMOLOGY
End: 2024-02-12
Payer: COMMERCIAL

## 2024-02-12 DIAGNOSIS — H52.4: ICD-10-CM

## 2024-02-12 DIAGNOSIS — H02.834: ICD-10-CM

## 2024-02-12 DIAGNOSIS — Z96.1: ICD-10-CM

## 2024-02-12 DIAGNOSIS — H16.223: ICD-10-CM

## 2024-02-12 DIAGNOSIS — H35.3131: ICD-10-CM

## 2024-02-12 DIAGNOSIS — H35.373: ICD-10-CM

## 2024-02-12 DIAGNOSIS — H40.1121: ICD-10-CM

## 2024-02-12 DIAGNOSIS — H40.1112: ICD-10-CM

## 2024-02-12 DIAGNOSIS — H26.493: ICD-10-CM

## 2024-02-12 DIAGNOSIS — H02.831: ICD-10-CM

## 2024-02-12 PROCEDURE — 92015 DETERMINE REFRACTIVE STATE: CPT | Performed by: OPHTHALMOLOGY

## 2024-02-12 PROCEDURE — 92133 CPTRZD OPH DX IMG PST SGM ON: CPT | Performed by: OPHTHALMOLOGY

## 2024-02-12 PROCEDURE — 99213 OFFICE O/P EST LOW 20 MIN: CPT | Performed by: OPHTHALMOLOGY

## 2024-02-12 PROCEDURE — 92083 EXTENDED VISUAL FIELD XM: CPT | Performed by: OPHTHALMOLOGY

## 2024-02-12 ASSESSMENT — REFRACTION_AUTOREFRACTION
OD_CYLINDER: +1.75
OD_SPHERE: -0.25
OS_CYLINDER: +1.00
OS_SPHERE: PLANO
OS_AXIS: 006
OD_AXIS: 178

## 2024-02-12 ASSESSMENT — REFRACTION_CURRENTRX
OD_VPRISM_DIRECTION: SV
OS_OVR_VA: 20/
OD_VPRISM_DIRECTION: SV
OS_CYLINDER: +1.50
OS_SPHERE: +0.75
OD_SPHERE: +2.50
OD_SPHERE: +2.75
OS_SPHERE: +2.75
OD_CYLINDER: +1.50
OS_VPRISM_DIRECTION: SV
OD_CYLINDER: +1.75
OD_VPRISM_DIRECTION: SV
OS_VPRISM_DIRECTION: SV
OD_OVR_VA: 20/
OD_SPHERE: -0.50
OD_OVR_VA: 20/
OS_VPRISM_DIRECTION: SV
OD_AXIS: 008
OS_CYLINDER: +1.00
OS_AXIS: 180
OS_OVR_VA: 20/
OS_OVR_VA: 20/
OS_AXIS: 179
OS_SPHERE: +2.50
OD_AXIS: 009
OD_OVR_VA: 20/

## 2024-02-12 ASSESSMENT — REFRACTION_MANIFEST
OD_CYLINDER: +1.50
OD_CYLINDER: +1.50
OS_VA1: 20/25-2
OS_AXIS: 005
OD_VA1: 20/100
OS_VA2: 20/20(J1+)
OS_ADD: +3.00
OU_VA: 20/25-2
OS_CYLINDER: +1.00
OD_VA2: 20/20(J1+)
OU_VA: 20/25-2
OD_AXIS: 180
OD_SPHERE: -0.25
OS_SPHERE: PLANO
OD_VA2: 20/20(J1+)
OS_CYLINDER: +1.00
OD_VA1: 20/100
OD_ADD: +2.75
OS_ADD: +2.75
OS_VA2: 20/20(J1+)
OS_VA1: 20/25-2
OD_SPHERE: -0.25
OS_AXIS: 005
OS_SPHERE: PLANO
OD_ADD: +3.00
OD_AXIS: 180

## 2024-02-12 ASSESSMENT — SPHEQUIV_DERIVED
OD_SPHEQUIV: 0.5
OD_SPHEQUIV: 0.625
OD_SPHEQUIV: 0.5

## 2024-02-12 ASSESSMENT — SUPERFICIAL PUNCTATE KERATITIS (SPK)
OD_SPK: 2+
OS_SPK: 2+

## 2024-02-12 ASSESSMENT — LID POSITION - DERMATOCHALASIS
OS_DERMATOCHALASIS: LUL 2+
OD_DERMATOCHALASIS: RUL 2+

## 2024-02-12 ASSESSMENT — CONFRONTATIONAL VISUAL FIELD TEST (CVF)
OD_FINDINGS: FULL
OS_FINDINGS: FULL

## 2024-03-04 ENCOUNTER — APPOINTMENT (OUTPATIENT)
Dept: CARDIOLOGY | Facility: CLINIC | Age: 84
End: 2024-03-04
Payer: MEDICARE

## 2024-03-04 VITALS
WEIGHT: 152 LBS | HEART RATE: 63 BPM | BODY MASS INDEX: 23.04 KG/M2 | HEIGHT: 68 IN | SYSTOLIC BLOOD PRESSURE: 140 MMHG | OXYGEN SATURATION: 98 % | DIASTOLIC BLOOD PRESSURE: 66 MMHG

## 2024-03-04 PROCEDURE — 93280 PM DEVICE PROGR EVAL DUAL: CPT

## 2024-03-04 NOTE — PROCEDURE
[NSR] : normal sinus rhythm [No] : not [Pacemaker] : pacemaker [See Device Printout] : See device printout [DDD] : DDD [Voltage: ___ volts] : Voltage was [unfilled] volts [Lead Imp:  ___ohms] : lead impedance was [unfilled] ohms [___V @] : [unfilled] V [Sensing Amplitude ___mv] : sensing amplitude was [unfilled] mv [___ ms] : [unfilled] ms [None] : none [de-identified] : RAZA  [de-identified] : [employer]  [de-identified] : [id number] [de-identified] : 4-24-13 [de-identified] :  [de-identified] : Battery longevity 5.7 months [de-identified] : AP 46%  <1 %  HVR, short duration with 1:1 AV conduction, short run of AT  Settings: RA: Amplitude 2.5V, pulse width 0.4 ms, sensitivity 0.1 mV RV: Amplitude 1.375 (auto), pulse width 0.4 ms, sensitivity 2mV  Pt declines remote monitoring.   F/U: DVC in 3 months Discussed red flag symptoms, which would warrant sooner or emergent medical evaluation. Any questions and concerns were addressed and resolved.  Sincerely,  Danielle Cassidy PA-C Patients history, testing and plan reviewed with supervising MD: Dr. Kelly Corea

## 2024-04-01 ENCOUNTER — APPOINTMENT (OUTPATIENT)
Dept: ORTHOPEDIC SURGERY | Facility: CLINIC | Age: 84
End: 2024-04-01
Payer: MEDICARE

## 2024-04-01 DIAGNOSIS — S22.000A WEDGE COMPRESSION FRACTURE OF UNSPECIFIED THORACIC VERTEBRA, INITIAL ENCOUNTER FOR CLOSED FRACTURE: ICD-10-CM

## 2024-04-01 DIAGNOSIS — Z98.890 OTHER SPECIFIED POSTPROCEDURAL STATES: ICD-10-CM

## 2024-04-01 PROCEDURE — 99214 OFFICE O/P EST MOD 30 MIN: CPT

## 2024-04-01 NOTE — HISTORY OF PRESENT ILLNESS
[de-identified] : S/P Laminectomy L2-3, 4-5 on 12/1/23. Patient states he has constant pain. Patient admits to going to PT 2x a week with no relief. Patient admits to taking Methocarbamol and Meloxicam for pain.  Today's Pain 7/10

## 2024-04-01 NOTE — ASSESSMENT
[FreeTextEntry1] : EMG - negative  PREOP MRI of L-Spine was reviewed today and is as follows:  Multilevel lumbar spondylosis Chronic L2 compression fracture Retrolisthesis L2-3 Moderate to severe stenosis L2-3 Moderate stenosis L4-5  84 y/o male presents today for follow up on his low back, S/P Laminectomy L2-3, 4-5 on 12/1/23. Patient with minimal pain over his lumbar spine. However, patient now with focal TTP over the thoracic spine suggestive of a thoracic compression fracture vs herniated disc. Patient has undergone PT for his mid and low back for the past 6-8 weeks with no relief. He was also prescribed meloxicam with minimal relief. Recommend proceeding with MRI at this time.    - Patient given prescription for MRI, follow up after study is completed to discuss results.   - Continue physical therapy to regain range of motion, strengthening and symptomatic improvement. Prescription given in office today.   - Recommend NSAIDs PRN - Recommend heating pad use to decrease muscle spasm - Discussed the importance of home exercises, including but not limited to hamstring stretching and core strengthening   Patient was educated on their diagnosis today. All questions answered and patient expressed understanding.  Follow up in 2 months

## 2024-05-06 ENCOUNTER — APPOINTMENT (OUTPATIENT)
Dept: ORTHOPEDIC SURGERY | Facility: CLINIC | Age: 84
End: 2024-05-06
Payer: MEDICARE

## 2024-05-06 PROCEDURE — 99214 OFFICE O/P EST MOD 30 MIN: CPT

## 2024-05-06 RX ORDER — LIDOCAINE 5% 700 MG/1
5 PATCH TOPICAL
Qty: 30 | Refills: 0 | Status: ACTIVE | COMMUNITY
Start: 2024-05-06 | End: 1900-01-01

## 2024-05-06 RX ORDER — METHOCARBAMOL 750 MG/1
750 TABLET, FILM COATED ORAL
Qty: 90 | Refills: 1 | Status: ACTIVE | COMMUNITY
Start: 2023-11-14 | End: 1900-01-01

## 2024-05-06 NOTE — ASSESSMENT
[FreeTextEntry1] : EMG - negative  PREOP MRI of L-Spine was reviewed today and is as follows:  Multilevel lumbar spondylosis Chronic L2 compression fracture Retrolisthesis L2-3 Moderate to severe stenosis L2-3 Moderate stenosis L4-5  4/2024 MRI of T-Spine was reviewed today and is as follows:  Acute compression deformity T12 with mild retropulsion  82 y/o male presents today for follow up on his low back, S/P Laminectomy L2-3, 4-5 on 12/1/23. Patient with MRI thoracic spine above shows acute compression fracture. Recommend bracing and medications.   - Recommend LSO brace to provide stability and facilitate healing following injury to the lumbar spine.   - Rx given for Lidoderm patches  - Patient given prescription for Robaxin 750mg today. Advised patient that medication may make them drowsy, start by taking it at night.   - Recommend NSAIDs PRN - Recommend heating pad use to decrease muscle spasm - Discussed the importance of home exercises, including but not limited to hamstring stretching and core strengthening   Patient was educated on their diagnosis today. All questions answered and patient expressed understanding.  Follow up in 1 month

## 2024-05-06 NOTE — DATA REVIEWED
[CT Scan] : CT scan [Thoracic Spine] : thoracic spine [I independently reviewed and interpreted images and report] : I independently reviewed and interpreted images and report [FreeTextEntry1] : 4/2024 MRI of T-Spine was reviewed today and is as follows:  Acute compression deformity T12 with mild retropulsion

## 2024-05-06 NOTE — REASON FOR VISIT
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

## 2024-05-06 NOTE — HISTORY OF PRESENT ILLNESS
[de-identified] : Follow up thoracic spine CT Results at [facility]. Patient states he continues to have constant pain. Patient admits to going to PT 2x a week with no relief. Patient admits to taking Methocarbamol for pain.  Today's Pain 7/10

## 2024-06-05 ENCOUNTER — OFFICE (OUTPATIENT)
Dept: URBAN - METROPOLITAN AREA CLINIC 97 | Facility: CLINIC | Age: 84
Setting detail: OPHTHALMOLOGY
End: 2024-06-05
Payer: COMMERCIAL

## 2024-06-05 DIAGNOSIS — H35.3131: ICD-10-CM

## 2024-06-05 DIAGNOSIS — H40.1121: ICD-10-CM

## 2024-06-05 DIAGNOSIS — H40.1112: ICD-10-CM

## 2024-06-05 DIAGNOSIS — H35.373: ICD-10-CM

## 2024-06-05 PROCEDURE — 92250 FUNDUS PHOTOGRAPHY W/I&R: CPT | Performed by: OPHTHALMOLOGY

## 2024-06-05 PROCEDURE — 99213 OFFICE O/P EST LOW 20 MIN: CPT | Performed by: OPHTHALMOLOGY

## 2024-06-05 ASSESSMENT — LID POSITION - DERMATOCHALASIS
OS_DERMATOCHALASIS: LUL 2+
OD_DERMATOCHALASIS: RUL 2+

## 2024-06-05 ASSESSMENT — CONFRONTATIONAL VISUAL FIELD TEST (CVF)
OS_FINDINGS: FULL
OD_FINDINGS: FULL

## 2024-06-24 ENCOUNTER — APPOINTMENT (OUTPATIENT)
Dept: CARDIOLOGY | Facility: CLINIC | Age: 84
End: 2024-06-24
Payer: MEDICARE

## 2024-06-24 VITALS
SYSTOLIC BLOOD PRESSURE: 134 MMHG | HEART RATE: 65 BPM | BODY MASS INDEX: 23.95 KG/M2 | HEIGHT: 68 IN | WEIGHT: 158 LBS | OXYGEN SATURATION: 96 % | DIASTOLIC BLOOD PRESSURE: 66 MMHG

## 2024-06-24 DIAGNOSIS — I49.5 SICK SINUS SYNDROME: ICD-10-CM

## 2024-06-24 DIAGNOSIS — Z95.0 PRESENCE OF CARDIAC PACEMAKER: ICD-10-CM

## 2024-06-24 PROCEDURE — 93280 PM DEVICE PROGR EVAL DUAL: CPT

## 2024-06-25 PROBLEM — M51.36 DDD (DEGENERATIVE DISC DISEASE), LUMBAR: Status: ACTIVE | Noted: 2022-09-19

## 2024-06-25 PROBLEM — I70.0 ATHEROSCLEROSIS OF AORTA: Status: ACTIVE | Noted: 2017-11-02

## 2024-06-25 PROBLEM — R94.31 ABNORMAL ELECTROCARDIOGRAM [ECG] [EKG]: Status: ACTIVE | Noted: 2017-11-02

## 2024-06-25 PROBLEM — I47.19 ATRIAL TACHYCARDIA, PAROXYSMAL: Status: ACTIVE | Noted: 2021-01-13

## 2024-06-25 PROBLEM — R09.89 BRUIT (ARTERIAL): Status: ACTIVE | Noted: 2017-11-02

## 2024-06-25 PROBLEM — Z87.891 EX-SMOKER: Status: ACTIVE | Noted: 2017-11-02

## 2024-06-25 PROBLEM — I10 ESSENTIAL (PRIMARY) HYPERTENSION: Status: ACTIVE | Noted: 2017-11-02

## 2024-07-02 ENCOUNTER — NON-APPOINTMENT (OUTPATIENT)
Age: 84
End: 2024-07-02

## 2024-07-02 ENCOUNTER — APPOINTMENT (OUTPATIENT)
Dept: CARDIOLOGY | Facility: CLINIC | Age: 84
End: 2024-07-02
Payer: MEDICARE

## 2024-07-02 VITALS
HEIGHT: 68 IN | WEIGHT: 156 LBS | DIASTOLIC BLOOD PRESSURE: 62 MMHG | OXYGEN SATURATION: 95 % | SYSTOLIC BLOOD PRESSURE: 130 MMHG | HEART RATE: 67 BPM | BODY MASS INDEX: 23.64 KG/M2

## 2024-07-02 DIAGNOSIS — I10 ESSENTIAL (PRIMARY) HYPERTENSION: ICD-10-CM

## 2024-07-02 DIAGNOSIS — R94.31 ABNORMAL ELECTROCARDIOGRAM [ECG] [EKG]: ICD-10-CM

## 2024-07-02 DIAGNOSIS — R09.89 OTHER SPECIFIED SYMPTOMS AND SIGNS INVOLVING THE CIRCULATORY AND RESPIRATORY SYSTEMS: ICD-10-CM

## 2024-07-02 DIAGNOSIS — I47.19 OTHER SUPRAVENTRICULAR TACHYCARDIA: ICD-10-CM

## 2024-07-02 DIAGNOSIS — M51.36 OTHER INTERVERTEBRAL DISC DEGENERATION, LUMBAR REGION: ICD-10-CM

## 2024-07-02 DIAGNOSIS — Z87.891 PERSONAL HISTORY OF NICOTINE DEPENDENCE: ICD-10-CM

## 2024-07-02 DIAGNOSIS — I70.0 ATHEROSCLEROSIS OF AORTA: ICD-10-CM

## 2024-07-02 PROBLEM — S22.080A WEDGE COMPRESSION FRACTURE OF T11-T12 VERTEBRA, INITIAL ENCOUNTER FOR CLOSED FRACTURE: Status: ACTIVE | Noted: 2024-05-06

## 2024-07-02 PROCEDURE — 99215 OFFICE O/P EST HI 40 MIN: CPT

## 2024-07-02 PROCEDURE — G2211 COMPLEX E/M VISIT ADD ON: CPT

## 2024-07-08 ENCOUNTER — APPOINTMENT (OUTPATIENT)
Dept: ORTHOPEDIC SURGERY | Facility: CLINIC | Age: 84
End: 2024-07-08
Payer: MEDICARE

## 2024-07-08 DIAGNOSIS — S22.080A WEDGE COMPRESSION FRACTURE OF T11-T12 VERTEBRA, INITIAL ENCOUNTER FOR CLOSED FRACTURE: ICD-10-CM

## 2024-07-08 DIAGNOSIS — Z98.890 OTHER SPECIFIED POSTPROCEDURAL STATES: ICD-10-CM

## 2024-07-08 DIAGNOSIS — S22.000A WEDGE COMPRESSION FRACTURE OF UNSPECIFIED THORACIC VERTEBRA, INITIAL ENCOUNTER FOR CLOSED FRACTURE: ICD-10-CM

## 2024-07-08 PROCEDURE — 99213 OFFICE O/P EST LOW 20 MIN: CPT

## 2024-08-05 ENCOUNTER — APPOINTMENT (OUTPATIENT)
Dept: CARDIOLOGY | Facility: CLINIC | Age: 84
End: 2024-08-05

## 2024-08-05 PROCEDURE — 93280 PM DEVICE PROGR EVAL DUAL: CPT

## 2024-08-05 NOTE — PROCEDURE
[No] : not [NSR] : normal sinus rhythm [See Device Printout] : See device printout [Pacemaker] : pacemaker [DDD] : DDD [Voltage: ___ volts] : Voltage was [unfilled] volts [Lead Imp:  ___ohms] : lead impedance was [unfilled] ohms [Sensing Amplitude ___mv] : sensing amplitude was [unfilled] mv [___V @] : [unfilled] V [___ ms] : [unfilled] ms [None] : none [de-identified] : RAZA  [de-identified] : [employer]  [de-identified] : [id number] [de-identified] : 4-24-13 [de-identified] :  [de-identified] : Battery longevity <3 months [de-identified] : AP 57%  <1 %  Settings: RA: Amplitude 2.5V, pulse width 0.4 ms, sensitivity 0.1 mV RV: Amplitude 1.375 (auto), pulse width 0.4 ms, sensitivity 2mV  Pt declines remote monitoring.   F/U: DVC in 4weeks Discussed red flag symptoms, which would warrant sooner or emergent medical evaluation. Any questions and concerns were addressed and resolved.  Sincerely,  Danielle Cassidy PA-C Patients history, testing and plan reviewed with supervising MD: Dr. Kelly Corea

## 2024-08-05 NOTE — PROCEDURE
[No] : not [NSR] : normal sinus rhythm [See Device Printout] : See device printout [Pacemaker] : pacemaker [DDD] : DDD [Voltage: ___ volts] : Voltage was [unfilled] volts [Lead Imp:  ___ohms] : lead impedance was [unfilled] ohms [Sensing Amplitude ___mv] : sensing amplitude was [unfilled] mv [___V @] : [unfilled] V [___ ms] : [unfilled] ms [None] : none [de-identified] : RAZA  [de-identified] : [employer]  [de-identified] : [id number] [de-identified] : 4-24-[birth year] [de-identified] :  [de-identified] : Battery longevity <3 months [de-identified] : AP 57%  <1 %  Settings: RA: Amplitude 2.5V, pulse width 0.4 ms, sensitivity 0.1 mV RV: Amplitude 1.375 (auto), pulse width 0.4 ms, sensitivity 2mV  Pt declines remote monitoring.   F/U: DVC in 4weeks Discussed red flag symptoms, which would warrant sooner or emergent medical evaluation. Any questions and concerns were addressed and resolved.  Sincerely,  Danielle Cassidy PA-C Patients history, testing and plan reviewed with supervising MD: Dr. Kelly Corea

## 2024-08-06 ENCOUNTER — APPOINTMENT (OUTPATIENT)
Dept: CARDIOLOGY | Facility: CLINIC | Age: 84
End: 2024-08-06

## 2024-08-06 ENCOUNTER — NON-APPOINTMENT (OUTPATIENT)
Age: 84
End: 2024-08-06

## 2024-08-06 PROCEDURE — G2211 COMPLEX E/M VISIT ADD ON: CPT

## 2024-08-06 PROCEDURE — 99215 OFFICE O/P EST HI 40 MIN: CPT

## 2024-08-06 PROCEDURE — 93306 TTE W/DOPPLER COMPLETE: CPT

## 2024-08-06 NOTE — REASON FOR VISIT
[Other: ____] : [unfilled] [Follow-up Device Check] : follow-up device check visit [FreeTextEntry1] : Bill has a past medical history of aortic stenosis, ascending aortic aneurysm, status post single-vessel CABG, bioprosthetic AVR, and ascending aortic repair at [facility] in April 2013, postop heart block, [employer] permanent pacemaker implant in April 2013 at [place], hypertension and dyslipidemia, Babisiosis, thrombocytopenia, multiple platelet transfusion, hematology Dr Agarwal, syncopal fall head trauma SDH admit [facility] August 2014, Prostate CA radiation therapy,  patent LIMA LAD with residual LCx medical therapy catheterization Feb 2023, small lateral infarct Myoview stress test Oct 2023.  Cardiovascular review of symptoms is negative for exertional chest pain, dyspnea, palpitations, dizziness or syncope. No PND or orthopnea leg edema. No bleeding or black stool.  Patient is exercising 20 minutes [employer] without exertional symptoms.  Patient is compliant with his diet medications.  Echocardiogram Aug 2024 LVEF 71%, normal bio AVR, mild MR, normal RVSP.  Echocardiogram Jan 2024 LVEF 55%, normal bio AVR, mild-moderate TR, mild MR, pacer lead seen in RV, diastolic dysfunction, normal RVSP  Carotid and abdominal ultrasound Jan 2024, mild nonobstructive plaque, normal abdominal aortic size.   Cardiac catheterization Feb 2023 patent JOHNSON LAD, OM1 80%, medical therapy  Echocardiogram Nov 2023 LVEF 55%, mild MR, normal bio AVR, mild-moderate TR, normal RVSP  Lexiscan Myoview stress test Jan 2023, LVEF 64%, moderate apical and apical lateral ischemic defect, no chest pain, nonischemic EKG response, baseline atrial pacing PRWP  [employer] permanent pacemaker check  implanted April 2013, dual-chamber A-pacing 36% V-pacing less than 1%, battery greater than 7 years, no episodes, follow-up 3 months  Echocardiogram Oct 2020, LVEF 55-60%, normal bioprosthetic AVR, mild MR and TR, mild pulmonary hypertension  Echocardiogram July 2019, LVEF 60%, normal bioprosthetic AVR, mild diastolic dysfunction, mild MR, normal RVSP   Lexascan Myoview stress July 2017, LVEF 54% diaphragm attenuation, no ischemia nonischemic EKG response, baseline sinus rhythm PRWP, NSST, no angina  2-D echo April 2017, LVEF 55%, function, normal bioprosthetic AVR, mild/moderate MR, normal aortic valve gradient, mild TR, PASP 35  Carotid and abdominal ultrasound April 2017, nonobstructive, proximal aorta 2.8 cm.  Bill is a 82-year-old male with medical history detailed above and active medical issues including:  - Limited exercise, abnormal Myoview stress test with moderate apical lateral ischemia. Stable on medical therapy for moderate CAD with patent grafts cardiac catheterization Feb 2023  - History of severe aortic stenosis, abnormal exercise stress echo, status post single-vessel CABG, bioprosthetic AVR, aortic root repair in April 2013.  - Brief PAF CHADS VASc score 3, history of subdural hematoma, moderate risk for oral anticoagulation, electrophysiologist Dr. Saad Reyes, no AC unless prolonged PAF.  - Postop heart block, [employer]irit dual chamber permanent pacemaker implant in April 2013.  - Hypertension at goal <130/80, follow up home BP daily over one week.  - History of Babesiosis/thrombocytopenia following with hematology  - Dyslipidemia intolerant to statins.  - Prior tobacco abuse.  Advised patient to follow active lifestyle with regular cardiovascular exercise. Patient educated on lifestyle and diet modification with low sodium low fat diet and avoidance of excessive alcohol. Patient is aware to call with any symptoms or concerns.  Cardiology follow-up 6 months with echocardiogram and Doppler studies. Current cardiac medications remain unchanged. Repeat labs will be ordered with PMD.  Bill will follow up with Dr. Bennett for primary care.   Total time spent 45 minutes, reviewing of test results, chart information, patient discussion, physical exam and completion of chart documentation.

## 2024-08-06 NOTE — REASON FOR VISIT
[Other: ____] : [unfilled] [Follow-up Device Check] : follow-up device check visit [FreeTextEntry1] : Bill has a past medical history of aortic stenosis, ascending aortic aneurysm, status post single-vessel CABG, bioprosthetic AVR, and ascending aortic repair at [facility] in April 2013, postop heart block, [employer] permanent pacemaker implant in April 2013 at [place], hypertension and dyslipidemia, Babisiosis, thrombocytopenia, multiple platelet transfusion, hematology Dr Agarwal, syncopal fall head trauma SDH admit [facility] August 2014, Prostate CA radiation therapy,  patent LIMA LAD with residual LCx medical therapy catheterization Feb 2023, small lateral infarct Myoview stress test Oct 2023.  Cardiovascular review of symptoms is negative for exertional chest pain, dyspnea, palpitations, dizziness or syncope. No PND or orthopnea leg edema. No bleeding or black stool.  Patient is exercising 20 minutes [employer] without exertional symptoms.  Patient is compliant with his diet medications.  Echocardiogram Aug 2024 LVEF 71%, normal bio AVR, mild MR, normal RVSP.  Echocardiogram Jan 2024 LVEF 55%, normal bio AVR, mild-moderate TR, mild MR, pacer lead seen in RV, diastolic dysfunction, normal RVSP  Carotid and abdominal ultrasound Jan 2024, mild nonobstructive plaque, normal abdominal aortic size.   Cardiac catheterization Feb 2023 patent JOHNSON LAD, OM1 80%, medical therapy  Echocardiogram Nov 2023 LVEF 55%, mild MR, normal bio AVR, mild-moderate TR, normal RVSP  Lexiscan Myoview stress test Jan 2023, LVEF 64%, moderate apical and apical lateral ischemic defect, no chest pain, nonischemic EKG response, baseline atrial pacing PRWP  [employer] permanent pacemaker check  implanted April 2013, dual-chamber A-pacing 36% V-pacing less than 1%, battery greater than 7 years, no episodes, follow-up 3 months  Echocardiogram Oct 2020, LVEF 55-60%, normal bioprosthetic AVR, mild MR and TR, mild pulmonary hypertension  Echocardiogram July 2019, LVEF 60%, normal bioprosthetic AVR, mild diastolic dysfunction, mild MR, normal RVSP   Lexascan Myoview stress July 2017, LVEF 54% diaphragm attenuation, no ischemia nonischemic EKG response, baseline sinus rhythm PRWP, NSST, no angina  2-D echo April 2017, LVEF 55%, function, normal bioprosthetic AVR, mild/moderate MR, normal aortic valve gradient, mild TR, PASP 35  Carotid and abdominal ultrasound April 2017, nonobstructive, proximal aorta 2.8 cm.  Bill is a 82-year-old male with medical history detailed above and active medical issues including:  - Limited exercise, abnormal Myoview stress test with moderate apical lateral ischemia. Stable on medical therapy for moderate CAD with patent grafts cardiac catheterization Feb 2023  - History of severe aortic stenosis, abnormal exercise stress echo, status post single-vessel CABG, bioprosthetic AVR, aortic root repair in April 2013.  - Brief PAF CHADS VASc score 3, history of subdural hematoma, moderate risk for oral anticoagulation, electrophysiologist Dr. Saad Reyes, no AC unless prolonged PAF.  - Postop heart block, [employer] Kirit dual chamber permanent pacemaker implant in April 2013.  - Hypertension at goal <130/80, follow up home BP daily over one week.  - History of Babesiosis/thrombocytopenia following with hematology  - Dyslipidemia intolerant to statins.  - Prior tobacco abuse.  Advised patient to follow active lifestyle with regular cardiovascular exercise. Patient educated on lifestyle and diet modification with low sodium low fat diet and avoidance of excessive alcohol. Patient is aware to call with any symptoms or concerns.  Cardiology follow-up 6 months with echocardiogram and Doppler studies. Current cardiac medications remain unchanged. Repeat labs will be ordered with PMD.  Bill will follow up with Dr. Bennett for primary care.   Total time spent 45 minutes, reviewing of test results, chart information, patient discussion, physical exam and completion of chart documentation.

## 2024-08-06 NOTE — PHYSICAL EXAM
[Well Developed] : well developed [Well Nourished] : well nourished [No Acute Distress] : no acute distress [Normal Venous Pressure] : normal venous pressure [No Carotid Bruit] : no carotid bruit [Normal S1, S2] : normal S1, S2 [No Rub] : no rub [No Gallop] : no gallop [Clear Lung Fields] : clear lung fields [Good Air Entry] : good air entry [No Respiratory Distress] : no respiratory distress  [Soft] : abdomen soft [Non Tender] : non-tender [No Masses/organomegaly] : no masses/organomegaly [Normal Bowel Sounds] : normal bowel sounds [Normal Gait] : normal gait [No Edema] : no edema [No Cyanosis] : no cyanosis [No Clubbing] : no clubbing [No Varicosities] : no varicosities [No Rash] : no rash [No Skin Lesions] : no skin lesions [Moves all extremities] : moves all extremities [No Focal Deficits] : no focal deficits [Normal Speech] : normal speech [Alert and Oriented] : alert and oriented [Normal memory] : normal memory [General Appearance - Well Developed] : well developed [Normal Appearance] : normal appearance [Well Groomed] : well groomed [General Appearance - Well Nourished] : well nourished [No Deformities] : no deformities [General Appearance - In No Acute Distress] : no acute distress [Normal Conjunctiva] : the conjunctiva exhibited no abnormalities [Eyelids - No Xanthelasma] : the eyelids demonstrated no xanthelasmas [Normal Oral Mucosa] : normal oral mucosa [No Oral Pallor] : no oral pallor [No Oral Cyanosis] : no oral cyanosis [Normal Jugular Venous A Waves Present] : normal jugular venous A waves present [Normal Jugular Venous V Waves Present] : normal jugular venous V waves present [No Jugular Venous Paiz A Waves] : no jugular venous paiz A waves [Respiration, Rhythm And Depth] : normal respiratory rhythm and effort [Exaggerated Use Of Accessory Muscles For Inspiration] : no accessory muscle use [Auscultation Breath Sounds / Voice Sounds] : lungs were clear to auscultation bilaterally [Heart Rate And Rhythm] : heart rate and rhythm were normal [Heart Sounds] : normal S1 and S2 [Systolic grade ___/6] : A grade [unfilled]/6 systolic murmur was heard. [Abdomen Soft] : soft [Abdomen Tenderness] : non-tender [Abdomen Mass (___ Cm)] : no abdominal mass palpated [Abnormal Walk] : normal gait [Gait - Sufficient For Exercise Testing] : the gait was sufficient for exercise testing [Nail Clubbing] : no clubbing of the fingernails [Cyanosis, Localized] : no localized cyanosis [Petechial Hemorrhages (___cm)] : no petechial hemorrhages [Skin Color & Pigmentation] : normal skin color and pigmentation [] : no rash [No Venous Stasis] : no venous stasis [Skin Lesions] : no skin lesions [No Skin Ulcers] : no skin ulcer [No Xanthoma] : no  xanthoma was observed [Oriented To Time, Place, And Person] : oriented to person, place, and time [Affect] : the affect was normal [Mood] : the mood was normal [No Anxiety] : not feeling anxious [de-identified] : 2/6 KRYSTINA, nl AVR click [FreeTextEntry1] : 2/6 KRYSTINA, nl AVR click, right upper chest PPM

## 2024-08-06 NOTE — DISCUSSION/SUMMARY
[FreeTextEntry1] : Patient has medical history detailed above and active medical issues including:   - Limited exercise, abnormal Myoview stress test Oct 2023 with small basal lateral infarct without ischemia.  Stable on medical therapy for moderate CAD with patent  LIMA LAD graft cardiac catheterization Feb 2023  - History of severe aortic stenosis, abnormal exercise stress echo, status post single-vessel CABG, bioprosthetic AVR, aortic root repair in April 2013.  - Brief PAF CHADS VASc score 3, history of fall subdural hematoma, electrophysiologist Dr. Saad Reyes high risk for oral anticoagulation, no AC unless prolonged PAF.  - Postop heart block, [employer] Kirit dual chamber permanent pacemaker implant in April 2013.  - Hypertension average resting BPs at guideline goal on Toprol, losartan  - History of Babesiosis/thrombocytopenia following with hematology   - Dyslipidemia on pravastatin well-tolerated  - Orthopedic back surgery Dr. Adenike Sanders [facility].  - Prior tobacco abuse.  Advised patient to follow active lifestyle with regular cardiovascular exercise. Patient educated on lifestyle and diet modification with low sodium low fat diet and avoidance of excessive alcohol. Patient is aware to call with any symptoms or concerns.   Cardiology follow-up 6 months.  Current cardiac medications remain unchanged. Repeat labs ordered  Bill will follow up with Dr. Bennett for primary care.   Total time spent 45 minutes, reviewing of test results, chart information, patient discussion, physical exam and completion of chart documentation.  none

## 2024-08-06 NOTE — PHYSICAL EXAM
[Well Developed] : well developed [Well Nourished] : well nourished [No Acute Distress] : no acute distress [Normal Venous Pressure] : normal venous pressure [No Carotid Bruit] : no carotid bruit [Normal S1, S2] : normal S1, S2 [No Rub] : no rub [No Gallop] : no gallop [Clear Lung Fields] : clear lung fields [Good Air Entry] : good air entry [No Respiratory Distress] : no respiratory distress  [Soft] : abdomen soft [Non Tender] : non-tender [No Masses/organomegaly] : no masses/organomegaly [Normal Bowel Sounds] : normal bowel sounds [Normal Gait] : normal gait [No Edema] : no edema [No Cyanosis] : no cyanosis [No Clubbing] : no clubbing [No Varicosities] : no varicosities [No Rash] : no rash [No Skin Lesions] : no skin lesions [Moves all extremities] : moves all extremities [No Focal Deficits] : no focal deficits [Normal Speech] : normal speech [Alert and Oriented] : alert and oriented [Normal memory] : normal memory [General Appearance - Well Developed] : well developed [Normal Appearance] : normal appearance [Well Groomed] : well groomed [General Appearance - Well Nourished] : well nourished [No Deformities] : no deformities [General Appearance - In No Acute Distress] : no acute distress [Normal Conjunctiva] : the conjunctiva exhibited no abnormalities [Eyelids - No Xanthelasma] : the eyelids demonstrated no xanthelasmas [Normal Oral Mucosa] : normal oral mucosa [No Oral Pallor] : no oral pallor [No Oral Cyanosis] : no oral cyanosis [Normal Jugular Venous A Waves Present] : normal jugular venous A waves present [Normal Jugular Venous V Waves Present] : normal jugular venous V waves present [No Jugular Venous Paiz A Waves] : no jugular venous paiz A waves [Respiration, Rhythm And Depth] : normal respiratory rhythm and effort [Exaggerated Use Of Accessory Muscles For Inspiration] : no accessory muscle use [Auscultation Breath Sounds / Voice Sounds] : lungs were clear to auscultation bilaterally [Heart Rate And Rhythm] : heart rate and rhythm were normal [Heart Sounds] : normal S1 and S2 [Systolic grade ___/6] : A grade [unfilled]/6 systolic murmur was heard. [Abdomen Soft] : soft [Abdomen Tenderness] : non-tender [Abdomen Mass (___ Cm)] : no abdominal mass palpated [Abnormal Walk] : normal gait [Gait - Sufficient For Exercise Testing] : the gait was sufficient for exercise testing [Nail Clubbing] : no clubbing of the fingernails [Cyanosis, Localized] : no localized cyanosis [Petechial Hemorrhages (___cm)] : no petechial hemorrhages [Skin Color & Pigmentation] : normal skin color and pigmentation [] : no rash [No Venous Stasis] : no venous stasis [Skin Lesions] : no skin lesions [No Skin Ulcers] : no skin ulcer [No Xanthoma] : no  xanthoma was observed [Oriented To Time, Place, And Person] : oriented to person, place, and time [Affect] : the affect was normal [Mood] : the mood was normal [No Anxiety] : not feeling anxious [de-identified] : 2/6 KRYSTINA, nl AVR click [FreeTextEntry1] : 2/6 KRYSTINA, nl AVR click, right upper chest PPM

## 2024-08-06 NOTE — PROCEDURE
[No] : not [NSR] : normal sinus rhythm [Pacemaker] : pacemaker [DDD] : DDD [Voltage: ___ volts] : Voltage was [unfilled] volts [Lead Imp:  ___ohms] : lead impedance was [unfilled] ohms [Sensing Amplitude ___mv] : sensing amplitude was [unfilled] mv [___V @] : [unfilled] V [___ ms] : [unfilled] ms [None] : none [Threshold Testing Performed] : Threshold testing was not performed [de-identified] : RAZA  [de-identified] : [employer]  [de-identified] : [id number] [de-identified] : 4-24-13 [de-identified] : 62 [de-identified] : 8.2-9.3 years.  [de-identified] : AP 30%\par   <1 %\par  1 Episodes AF < 10s and one episode of Aflutter vs SVT <10 s\par  \par  He has history of this, however given prior history of syncopal fall and SDH, EP advised no AC unless prolonged AF. \par  \par  Continue 3 months follow up. \par

## 2024-08-06 NOTE — PROCEDURE
[No] : not [NSR] : normal sinus rhythm [Pacemaker] : pacemaker [DDD] : DDD [Voltage: ___ volts] : Voltage was [unfilled] volts [Lead Imp:  ___ohms] : lead impedance was [unfilled] ohms [Sensing Amplitude ___mv] : sensing amplitude was [unfilled] mv [___V @] : [unfilled] V [___ ms] : [unfilled] ms [None] : none [Threshold Testing Performed] : Threshold testing was not performed [de-identified] : RAZA  [de-identified] : [employer]  [de-identified] : [id number] [de-identified] : 4-24-13 [de-identified] : 62 [de-identified] : 8.2-9.3 years.  [de-identified] : AP 30%\par   <1 %\par  1 Episodes AF < 10s and one episode of Aflutter vs SVT <10 s\par  \par  He has history of this, however given prior history of syncopal fall and SDH, EP advised no AC unless prolonged AF. \par  \par  Continue 3 months follow up. \par

## 2024-08-06 NOTE — DISCUSSION/SUMMARY
[FreeTextEntry1] : Patient has medical history detailed above and active medical issues including:   - Limited exercise, abnormal Myoview stress test Oct 2023 with small basal lateral infarct without ischemia.  Stable on medical therapy for moderate CAD with patent  LIMA LAD graft cardiac catheterization Feb 2023  - History of severe aortic stenosis, abnormal exercise stress echo, status post single-vessel CABG, bioprosthetic AVR, aortic root repair in April 2013.  - Brief PAF CHADS VASc score 3, history of fall subdural hematoma, electrophysiologist Dr. Saad Reyes high risk for oral anticoagulation, no AC unless prolonged PAF.  - Postop heart block, [employer]irit dual chamber permanent pacemaker implant in April 2013.  - Hypertension average resting BPs at guideline goal on Toprol, losartan  - History of Babesiosis/thrombocytopenia following with hematology   - Dyslipidemia on pravastatin well-tolerated  - Orthopedic back surgery Dr. Adenike Sanders [facility].  - Prior tobacco abuse.  Advised patient to follow active lifestyle with regular cardiovascular exercise. Patient educated on lifestyle and diet modification with low sodium low fat diet and avoidance of excessive alcohol. Patient is aware to call with any symptoms or concerns.   Cardiology follow-up 6 months.  Current cardiac medications remain unchanged. Repeat labs ordered  Bill will follow up with Dr. Bennett for primary care.   Total time spent 45 minutes, reviewing of test results, chart information, patient discussion, physical exam and completion of chart documentation.

## 2024-09-04 ENCOUNTER — OFFICE (OUTPATIENT)
Dept: URBAN - METROPOLITAN AREA CLINIC 97 | Facility: CLINIC | Age: 84
Setting detail: OPHTHALMOLOGY
End: 2024-09-04
Payer: COMMERCIAL

## 2024-09-04 DIAGNOSIS — Z96.1: ICD-10-CM

## 2024-09-04 DIAGNOSIS — H35.3131: ICD-10-CM

## 2024-09-04 DIAGNOSIS — H02.834: ICD-10-CM

## 2024-09-04 DIAGNOSIS — H26.493: ICD-10-CM

## 2024-09-04 DIAGNOSIS — H35.373: ICD-10-CM

## 2024-09-04 DIAGNOSIS — H40.1112: ICD-10-CM

## 2024-09-04 DIAGNOSIS — H02.831: ICD-10-CM

## 2024-09-04 DIAGNOSIS — H16.223: ICD-10-CM

## 2024-09-04 DIAGNOSIS — H40.1121: ICD-10-CM

## 2024-09-04 PROCEDURE — 92083 EXTENDED VISUAL FIELD XM: CPT | Performed by: OPHTHALMOLOGY

## 2024-09-04 PROCEDURE — 99213 OFFICE O/P EST LOW 20 MIN: CPT | Performed by: OPHTHALMOLOGY

## 2024-09-04 ASSESSMENT — LID POSITION - DERMATOCHALASIS
OS_DERMATOCHALASIS: LUL 2+
OD_DERMATOCHALASIS: RUL 2+

## 2024-09-04 ASSESSMENT — CONFRONTATIONAL VISUAL FIELD TEST (CVF)
OS_FINDINGS: FULL
OD_FINDINGS: FULL

## 2024-09-16 ENCOUNTER — APPOINTMENT (OUTPATIENT)
Dept: CARDIOLOGY | Facility: CLINIC | Age: 84
End: 2024-09-16
Payer: MEDICARE

## 2024-09-16 VITALS
BODY MASS INDEX: 23.79 KG/M2 | HEART RATE: 64 BPM | OXYGEN SATURATION: 98 % | HEIGHT: 68 IN | SYSTOLIC BLOOD PRESSURE: 172 MMHG | DIASTOLIC BLOOD PRESSURE: 72 MMHG | WEIGHT: 157 LBS

## 2024-09-16 DIAGNOSIS — I49.5 SICK SINUS SYNDROME: ICD-10-CM

## 2024-09-16 DIAGNOSIS — Z95.0 PRESENCE OF CARDIAC PACEMAKER: ICD-10-CM

## 2024-09-16 PROCEDURE — 93280 PM DEVICE PROGR EVAL DUAL: CPT

## 2024-09-16 NOTE — PROCEDURE
[No] : not [NSR] : normal sinus rhythm [See Device Printout] : See device printout [Pacemaker] : pacemaker [DDD] : DDD [Voltage: ___ volts] : Voltage was [unfilled] volts [Lead Imp:  ___ohms] : lead impedance was [unfilled] ohms [Sensing Amplitude ___mv] : sensing amplitude was [unfilled] mv [___V @] : [unfilled] V [___ ms] : [unfilled] ms [None] : none [de-identified] : [employer]  [de-identified] : RAZA  [de-identified] : [id number] [de-identified] : 4-24-[birth year] [de-identified] :  [de-identified] : Battery longevity <3 months [de-identified] : AP 55%  <1 %  Settings: RA: Amplitude 2.5V, pulse width 0.4 ms, sensitivity 0.1 mV RV: Amplitude 1.375 (auto), pulse width 0.4 ms, sensitivity 2mV  Pt declines remote monitoring.   F/U: DVC in 4weeks Discussed red flag symptoms, which would warrant sooner or emergent medical evaluation. Any questions and concerns were addressed and resolved.  Sincerely,  Danielle Cassidy PA-C Patients history, testing and plan reviewed with supervising MD: Dr. Kelly Corea

## 2024-09-16 NOTE — PROCEDURE
[No] : not [NSR] : normal sinus rhythm [See Device Printout] : See device printout [Pacemaker] : pacemaker [DDD] : DDD [Voltage: ___ volts] : Voltage was [unfilled] volts [Lead Imp:  ___ohms] : lead impedance was [unfilled] ohms [Sensing Amplitude ___mv] : sensing amplitude was [unfilled] mv [___V @] : [unfilled] V [___ ms] : [unfilled] ms [None] : none [de-identified] : [employer]  [de-identified] : RAZA  [de-identified] : [id number] [de-identified] : 4-24-13 [de-identified] :  [de-identified] : Battery longevity <3 months [de-identified] : AP 55%  <1 %  Settings: RA: Amplitude 2.5V, pulse width 0.4 ms, sensitivity 0.1 mV RV: Amplitude 1.375 (auto), pulse width 0.4 ms, sensitivity 2mV  Pt declines remote monitoring.   F/U: DVC in 4weeks Discussed red flag symptoms, which would warrant sooner or emergent medical evaluation. Any questions and concerns were addressed and resolved.  Sincerely,  Danielle Cassidy PA-C Patients history, testing and plan reviewed with supervising MD: Dr. Kelly Corea

## 2024-10-02 ENCOUNTER — OFFICE (OUTPATIENT)
Dept: URBAN - METROPOLITAN AREA CLINIC 97 | Facility: CLINIC | Age: 84
Setting detail: OPHTHALMOLOGY
End: 2024-10-02
Payer: COMMERCIAL

## 2024-10-02 ENCOUNTER — RX ONLY (RX ONLY)
Age: 84
End: 2024-10-02

## 2024-10-02 DIAGNOSIS — H40.1112: ICD-10-CM

## 2024-10-02 PROCEDURE — 66030 INJECTION TREATMENT OF EYE: CPT | Mod: RT | Performed by: OPHTHALMOLOGY

## 2024-10-02 ASSESSMENT — REFRACTION_MANIFEST
OS_AXIS: 005
OD_CYLINDER: +1.50
OD_ADD: +2.75
OS_AXIS: 005
OD_VA2: 20/20(J1+)
OD_VA1: 20/100
OS_CYLINDER: +1.00
OS_CYLINDER: +1.00
OD_CYLINDER: +1.50
OS_VA1: 20/25-2
OD_VA2: 20/20(J1+)
OS_SPHERE: PLANO
OD_ADD: +3.00
OS_SPHERE: PLANO
OS_ADD: +2.75
OD_VA1: 20/100
OS_VA2: 20/20(J1+)
OS_VA1: 20/25-2
OU_VA: 20/25-2
OD_AXIS: 180
OD_AXIS: 180
OD_SPHERE: -0.25
OS_VA2: 20/20(J1+)
OS_ADD: +3.00
OU_VA: 20/25-2
OD_SPHERE: -0.25

## 2024-10-02 ASSESSMENT — REFRACTION_CURRENTRX
OD_AXIS: 009
OD_CYLINDER: +1.50
OS_AXIS: 180
OD_SPHERE: +2.50
OD_OVR_VA: 20/
OD_SPHERE: +2.75
OS_VPRISM_DIRECTION: SV
OD_VPRISM_DIRECTION: SV
OS_CYLINDER: +1.50
OS_SPHERE: +2.50
OS_VPRISM_DIRECTION: SV
OS_CYLINDER: +1.00
OD_AXIS: 008
OS_AXIS: 179
OD_OVR_VA: 20/
OD_OVR_VA: 20/
OS_VPRISM_DIRECTION: SV
OD_SPHERE: -0.50
OS_OVR_VA: 20/
OD_VPRISM_DIRECTION: SV
OS_SPHERE: +0.75
OS_OVR_VA: 20/
OD_CYLINDER: +1.75
OS_OVR_VA: 20/
OS_SPHERE: +2.75
OD_VPRISM_DIRECTION: SV

## 2024-10-02 ASSESSMENT — TONOMETRY
OS_IOP_MMHG: 14
OD_IOP_MMHG: 16

## 2024-10-02 ASSESSMENT — KERATOMETRY
OD_K2POWER_DIOPTERS: 44.25
OS_K1POWER_DIOPTERS: 43.00
OS_AXISANGLE_DEGREES: 152
OD_K1POWER_DIOPTERS: 43.25
METHOD_AUTO_MANUAL: AUTO
OD_AXISANGLE_DEGREES: 003
OS_K2POWER_DIOPTERS: 44.00

## 2024-10-02 ASSESSMENT — REFRACTION_AUTOREFRACTION
OD_CYLINDER: +1.75
OD_SPHERE: +0.50
OS_AXIS: 030
OS_CYLINDER: +2.75
OS_SPHERE: -1.25
OD_AXIS: 178

## 2024-10-02 ASSESSMENT — PACHYMETRY
OS_CT_UM: 526
OS_CT_CORRECTION: 1

## 2024-10-02 ASSESSMENT — CONFRONTATIONAL VISUAL FIELD TEST (CVF)
OS_FINDINGS: FULL
OD_FINDINGS: FULL

## 2024-10-02 ASSESSMENT — VISUAL ACUITY
OS_BCVA: 20/100+
OD_BCVA: 20/40-2

## 2024-10-02 ASSESSMENT — SUPERFICIAL PUNCTATE KERATITIS (SPK)
OD_SPK: 2+
OS_SPK: 2+

## 2024-10-07 ENCOUNTER — APPOINTMENT (OUTPATIENT)
Dept: CARDIOLOGY | Facility: CLINIC | Age: 84
End: 2024-10-07
Payer: MEDICARE

## 2024-10-07 DIAGNOSIS — I49.5 SICK SINUS SYNDROME: ICD-10-CM

## 2024-10-07 DIAGNOSIS — Z95.0 PRESENCE OF CARDIAC PACEMAKER: ICD-10-CM

## 2024-10-07 PROCEDURE — 93280 PM DEVICE PROGR EVAL DUAL: CPT

## 2024-10-14 ENCOUNTER — OFFICE (OUTPATIENT)
Dept: URBAN - METROPOLITAN AREA CLINIC 38 | Facility: CLINIC | Age: 84
Setting detail: OPHTHALMOLOGY
End: 2024-10-14
Payer: COMMERCIAL

## 2024-10-14 DIAGNOSIS — H40.1112: ICD-10-CM

## 2024-10-14 DIAGNOSIS — H40.1121: ICD-10-CM

## 2024-10-14 PROCEDURE — 99213 OFFICE O/P EST LOW 20 MIN: CPT | Performed by: OPHTHALMOLOGY

## 2024-10-14 ASSESSMENT — REFRACTION_CURRENTRX
OS_OVR_VA: 20/
OD_OVR_VA: 20/
OD_AXIS: 009
OS_OVR_VA: 20/
OD_AXIS: 008
OS_OVR_VA: 20/
OS_VPRISM_DIRECTION: SV
OD_CYLINDER: +1.75
OS_VPRISM_DIRECTION: SV
OS_AXIS: 180
OD_OVR_VA: 20/
OD_SPHERE: +2.50
OD_VPRISM_DIRECTION: SV
OS_SPHERE: +2.75
OD_OVR_VA: 20/
OD_SPHERE: +2.75
OS_SPHERE: +2.50
OD_CYLINDER: +1.50
OS_SPHERE: +0.75
OS_AXIS: 179
OD_SPHERE: -0.50
OD_VPRISM_DIRECTION: SV
OS_CYLINDER: +1.00
OS_VPRISM_DIRECTION: SV
OD_VPRISM_DIRECTION: SV
OS_CYLINDER: +1.50

## 2024-10-14 ASSESSMENT — KERATOMETRY
OS_K1POWER_DIOPTERS: 43.00
OS_AXISANGLE_DEGREES: 180
OD_K2POWER_DIOPTERS: 45.00
METHOD_AUTO_MANUAL: AUTO
OD_K1POWER_DIOPTERS: 43.25
OD_AXISANGLE_DEGREES: 005
OS_K2POWER_DIOPTERS: 43.75

## 2024-10-14 ASSESSMENT — VISUAL ACUITY
OD_BCVA: 20/80
OS_BCVA: 20/70

## 2024-10-14 ASSESSMENT — REFRACTION_MANIFEST
OD_AXIS: 180
OD_ADD: +2.75
OD_VA2: 20/20(J1+)
OS_AXIS: 005
OD_CYLINDER: +1.50
OD_SPHERE: -0.25
OS_VA2: 20/20(J1+)
OD_SPHERE: -0.25
OS_ADD: +3.00
OD_AXIS: 180
OS_VA2: 20/20(J1+)
OS_CYLINDER: +1.00
OS_ADD: +2.75
OS_SPHERE: PLANO
OS_VA1: 20/25-2
OS_CYLINDER: +1.00
OD_VA2: 20/20(J1+)
OD_VA1: 20/100
OD_CYLINDER: +1.50
OS_VA1: 20/25-2
OD_VA1: 20/100
OU_VA: 20/25-2
OD_ADD: +3.00
OS_AXIS: 005
OU_VA: 20/25-2
OS_SPHERE: PLANO

## 2024-10-14 ASSESSMENT — CONFRONTATIONAL VISUAL FIELD TEST (CVF)
OS_FINDINGS: FULL
OD_FINDINGS: FULL

## 2024-10-14 ASSESSMENT — REFRACTION_AUTOREFRACTION
OD_AXIS: 107
OD_SPHERE: +1.50
OS_CYLINDER: -1.25
OS_AXIS: 123
OD_CYLINDER: -3.00
OS_SPHERE: +1.50

## 2024-10-14 ASSESSMENT — PACHYMETRY
OS_CT_CORRECTION: 1
OS_CT_UM: 526

## 2024-10-14 ASSESSMENT — SUPERFICIAL PUNCTATE KERATITIS (SPK)
OS_SPK: 2+
OD_SPK: 2+

## 2024-10-14 ASSESSMENT — LID POSITION - DERMATOCHALASIS
OS_DERMATOCHALASIS: LUL 2+
OD_DERMATOCHALASIS: RUL 2+

## 2024-11-21 ENCOUNTER — APPOINTMENT (OUTPATIENT)
Dept: CARDIOLOGY | Facility: CLINIC | Age: 84
End: 2024-11-21
Payer: MEDICARE

## 2024-11-21 VITALS
SYSTOLIC BLOOD PRESSURE: 144 MMHG | OXYGEN SATURATION: 96 % | WEIGHT: 161 LBS | DIASTOLIC BLOOD PRESSURE: 58 MMHG | BODY MASS INDEX: 24.48 KG/M2 | HEART RATE: 76 BPM

## 2024-11-21 DIAGNOSIS — Z95.0 PRESENCE OF CARDIAC PACEMAKER: ICD-10-CM

## 2024-11-21 DIAGNOSIS — I49.5 SICK SINUS SYNDROME: ICD-10-CM

## 2024-11-21 PROCEDURE — 93280 PM DEVICE PROGR EVAL DUAL: CPT

## 2024-12-05 ENCOUNTER — OFFICE (OUTPATIENT)
Dept: URBAN - METROPOLITAN AREA CLINIC 97 | Facility: CLINIC | Age: 84
Setting detail: OPHTHALMOLOGY
End: 2024-12-05
Payer: COMMERCIAL

## 2024-12-05 DIAGNOSIS — H40.1121: ICD-10-CM

## 2024-12-05 DIAGNOSIS — H35.373: ICD-10-CM

## 2024-12-05 DIAGNOSIS — H40.1112: ICD-10-CM

## 2024-12-05 DIAGNOSIS — H35.3131: ICD-10-CM

## 2024-12-05 PROCEDURE — 92134 CPTRZ OPH DX IMG PST SGM RTA: CPT | Performed by: OPHTHALMOLOGY

## 2024-12-05 PROCEDURE — 99213 OFFICE O/P EST LOW 20 MIN: CPT | Performed by: OPHTHALMOLOGY

## 2024-12-05 ASSESSMENT — REFRACTION_MANIFEST
OS_SPHERE: PLANO
OD_VA2: 20/20(J1+)
OS_VA1: 20/25-2
OS_AXIS: 005
OS_SPHERE: PLANO
OS_ADD: +3.00
OD_AXIS: 180
OS_AXIS: 005
OD_ADD: +3.00
OD_AXIS: 180
OD_ADD: +2.75
OD_CYLINDER: +1.50
OD_VA2: 20/20(J1+)
OD_CYLINDER: +1.50
OD_VA1: 20/100
OS_ADD: +2.75
OS_CYLINDER: +1.00
OS_CYLINDER: +1.00
OS_VA1: 20/25-2
OD_VA1: 20/100
OD_SPHERE: -0.25
OU_VA: 20/25-2
OS_VA2: 20/20(J1+)
OU_VA: 20/25-2
OD_SPHERE: -0.25
OS_VA2: 20/20(J1+)

## 2024-12-05 ASSESSMENT — REFRACTION_CURRENTRX
OD_OVR_VA: 20/
OS_OVR_VA: 20/
OD_SPHERE: +2.75
OS_VPRISM_DIRECTION: SV
OS_SPHERE: +0.75
OS_OVR_VA: 20/
OD_AXIS: 009
OS_SPHERE: +2.50
OD_OVR_VA: 20/
OS_OVR_VA: 20/
OD_SPHERE: +2.50
OD_OVR_VA: 20/
OD_AXIS: 008
OD_CYLINDER: +1.50
OS_SPHERE: +2.75
OD_VPRISM_DIRECTION: SV
OS_AXIS: 179
OD_SPHERE: -0.50
OD_CYLINDER: +1.75
OS_AXIS: 180
OS_VPRISM_DIRECTION: SV
OS_VPRISM_DIRECTION: SV
OS_CYLINDER: +1.50
OD_VPRISM_DIRECTION: SV
OS_CYLINDER: +1.00
OD_VPRISM_DIRECTION: SV

## 2024-12-05 ASSESSMENT — CONFRONTATIONAL VISUAL FIELD TEST (CVF)
OD_FINDINGS: FULL
OS_FINDINGS: FULL

## 2024-12-05 ASSESSMENT — REFRACTION_AUTOREFRACTION
OD_CYLINDER: -3.00
OD_SPHERE: +1.50
OS_SPHERE: +1.50
OS_CYLINDER: -1.25
OS_AXIS: 123
OD_AXIS: 107

## 2024-12-05 ASSESSMENT — SUPERFICIAL PUNCTATE KERATITIS (SPK)
OS_SPK: 2+
OD_SPK: 2+

## 2024-12-05 ASSESSMENT — VISUAL ACUITY
OS_BCVA: 20/70
OD_BCVA: 20/70+2

## 2024-12-05 ASSESSMENT — PACHYMETRY
OS_CT_CORRECTION: 1
OS_CT_UM: 526

## 2024-12-05 ASSESSMENT — TONOMETRY
OS_IOP_MMHG: 14
OD_IOP_MMHG: 14

## 2024-12-05 ASSESSMENT — KERATOMETRY
OD_AXISANGLE_DEGREES: 005
OS_K2POWER_DIOPTERS: 43.75
OS_K1POWER_DIOPTERS: 43.00
METHOD_AUTO_MANUAL: AUTO
OS_AXISANGLE_DEGREES: 180
OD_K2POWER_DIOPTERS: 45.00
OD_K1POWER_DIOPTERS: 43.25

## 2024-12-05 ASSESSMENT — LID POSITION - DERMATOCHALASIS
OS_DERMATOCHALASIS: LUL 2+
OD_DERMATOCHALASIS: RUL 2+

## 2024-12-16 ENCOUNTER — RESULT REVIEW (OUTPATIENT)
Age: 84
End: 2024-12-16

## 2024-12-24 ENCOUNTER — APPOINTMENT (OUTPATIENT)
Dept: CARDIOLOGY | Facility: CLINIC | Age: 84
End: 2024-12-24
Payer: MEDICARE

## 2024-12-24 VITALS — DIASTOLIC BLOOD PRESSURE: 50 MMHG | SYSTOLIC BLOOD PRESSURE: 142 MMHG

## 2024-12-24 VITALS — HEART RATE: 71 BPM | WEIGHT: 153 LBS | BODY MASS INDEX: 23.26 KG/M2 | OXYGEN SATURATION: 97 %

## 2024-12-24 DIAGNOSIS — I49.5 SICK SINUS SYNDROME: ICD-10-CM

## 2024-12-24 PROCEDURE — 99024 POSTOP FOLLOW-UP VISIT: CPT

## 2025-02-25 ENCOUNTER — APPOINTMENT (OUTPATIENT)
Dept: CARDIOLOGY | Facility: CLINIC | Age: 85
End: 2025-02-25

## 2025-02-25 VITALS
WEIGHT: 144 LBS | OXYGEN SATURATION: 97 % | SYSTOLIC BLOOD PRESSURE: 148 MMHG | DIASTOLIC BLOOD PRESSURE: 52 MMHG | BODY MASS INDEX: 21.82 KG/M2 | HEART RATE: 60 BPM | HEIGHT: 68 IN

## 2025-02-25 DIAGNOSIS — I35.0 NONRHEUMATIC AORTIC (VALVE) STENOSIS: ICD-10-CM

## 2025-02-25 DIAGNOSIS — I34.0 NONRHEUMATIC MITRAL (VALVE) INSUFFICIENCY: ICD-10-CM

## 2025-02-25 DIAGNOSIS — Z95.3 PRESENCE OF XENOGENIC HEART VALVE: ICD-10-CM

## 2025-02-25 DIAGNOSIS — I70.0 ATHEROSCLEROSIS OF AORTA: ICD-10-CM

## 2025-02-25 DIAGNOSIS — Z95.2 PRESENCE OF PROSTHETIC HEART VALVE: ICD-10-CM

## 2025-02-25 DIAGNOSIS — I47.19 OTHER SUPRAVENTRICULAR TACHYCARDIA: ICD-10-CM

## 2025-02-25 DIAGNOSIS — R94.31 ABNORMAL ELECTROCARDIOGRAM [ECG] [EKG]: ICD-10-CM

## 2025-02-25 DIAGNOSIS — I10 ESSENTIAL (PRIMARY) HYPERTENSION: ICD-10-CM

## 2025-02-25 DIAGNOSIS — Z87.891 PERSONAL HISTORY OF NICOTINE DEPENDENCE: ICD-10-CM

## 2025-02-25 DIAGNOSIS — Z95.0 PRESENCE OF CARDIAC PACEMAKER: ICD-10-CM

## 2025-02-25 DIAGNOSIS — R09.89 OTHER SPECIFIED SYMPTOMS AND SIGNS INVOLVING THE CIRCULATORY AND RESPIRATORY SYSTEMS: ICD-10-CM

## 2025-02-25 PROCEDURE — 99213 OFFICE O/P EST LOW 20 MIN: CPT | Mod: 24

## 2025-03-25 ENCOUNTER — APPOINTMENT (OUTPATIENT)
Dept: CARDIOLOGY | Facility: CLINIC | Age: 85
End: 2025-03-25
Payer: MEDICARE

## 2025-03-25 ENCOUNTER — NON-APPOINTMENT (OUTPATIENT)
Age: 85
End: 2025-03-25

## 2025-03-25 PROCEDURE — 93296 REM INTERROG EVL PM/IDS: CPT

## 2025-03-25 PROCEDURE — 93294 REM INTERROG EVL PM/LDLS PM: CPT

## 2025-03-26 ENCOUNTER — APPOINTMENT (OUTPATIENT)
Dept: CARDIOLOGY | Facility: CLINIC | Age: 85
End: 2025-03-26

## 2025-04-02 ENCOUNTER — OFFICE (OUTPATIENT)
Dept: URBAN - METROPOLITAN AREA CLINIC 97 | Facility: CLINIC | Age: 85
Setting detail: OPHTHALMOLOGY
End: 2025-04-02
Payer: COMMERCIAL

## 2025-04-02 DIAGNOSIS — H40.1112: ICD-10-CM

## 2025-04-02 DIAGNOSIS — H40.1121: ICD-10-CM

## 2025-04-02 PROCEDURE — 99213 OFFICE O/P EST LOW 20 MIN: CPT | Performed by: OPHTHALMOLOGY

## 2025-04-02 ASSESSMENT — REFRACTION_MANIFEST
OS_AXIS: 005
OD_VA2: 20/20(J1+)
OS_SPHERE: PLANO
OD_VA1: 20/100
OS_SPHERE: PLANO
OS_ADD: +2.75
OU_VA: 20/25-2
OD_VA1: 20/100
OS_ADD: +3.00
OD_SPHERE: -0.25
OD_ADD: +2.75
OD_ADD: +3.00
OS_VA1: 20/25-2
OS_CYLINDER: +1.00
OS_VA2: 20/20(J1+)
OD_AXIS: 180
OD_SPHERE: -0.25
OS_CYLINDER: +1.00
OS_VA1: 20/25-2
OU_VA: 20/25-2
OD_CYLINDER: +1.50
OD_AXIS: 180
OS_AXIS: 005
OD_VA2: 20/20(J1+)
OD_CYLINDER: +1.50
OS_VA2: 20/20(J1+)

## 2025-04-02 ASSESSMENT — REFRACTION_CURRENTRX
OD_VPRISM_DIRECTION: SV
OD_SPHERE: +2.50
OS_SPHERE: +2.50
OS_AXIS: 180
OS_OVR_VA: 20/
OD_OVR_VA: 20/
OS_VPRISM_DIRECTION: SV
OS_OVR_VA: 20/
OD_OVR_VA: 20/
OD_VPRISM_DIRECTION: SV
OD_OVR_VA: 20/
OS_SPHERE: +0.75
OD_SPHERE: +2.75
OS_CYLINDER: +1.50
OD_SPHERE: -0.50
OD_AXIS: 009
OD_CYLINDER: +1.50
OD_CYLINDER: +1.75
OD_VPRISM_DIRECTION: SV
OS_OVR_VA: 20/
OS_VPRISM_DIRECTION: SV
OD_AXIS: 008
OS_VPRISM_DIRECTION: SV
OS_AXIS: 179
OS_SPHERE: +2.75
OS_CYLINDER: +1.00

## 2025-04-02 ASSESSMENT — KERATOMETRY: METHOD_AUTO_MANUAL: AUTO

## 2025-04-02 ASSESSMENT — PACHYMETRY
OS_CT_UM: 526
OS_CT_CORRECTION: 1

## 2025-04-02 ASSESSMENT — TONOMETRY
OD_IOP_MMHG: 12
OS_IOP_MMHG: 12

## 2025-04-02 ASSESSMENT — SUPERFICIAL PUNCTATE KERATITIS (SPK)
OS_SPK: 2+
OD_SPK: 2+

## 2025-04-02 ASSESSMENT — LID POSITION - DERMATOCHALASIS
OD_DERMATOCHALASIS: RUL 2+
OS_DERMATOCHALASIS: LUL 2+

## 2025-04-02 ASSESSMENT — CONFRONTATIONAL VISUAL FIELD TEST (CVF)
OS_FINDINGS: FULL
OD_FINDINGS: FULL

## 2025-04-02 ASSESSMENT — VISUAL ACUITY
OD_BCVA: 20/70+2
OS_BCVA: 20/70+

## 2025-05-12 ENCOUNTER — OFFICE (OUTPATIENT)
Dept: URBAN - METROPOLITAN AREA CLINIC 38 | Facility: CLINIC | Age: 85
Setting detail: OPHTHALMOLOGY
End: 2025-05-12
Payer: COMMERCIAL

## 2025-05-12 DIAGNOSIS — H40.1121: ICD-10-CM

## 2025-05-12 PROCEDURE — 66030 INJECTION TREATMENT OF EYE: CPT | Performed by: OPHTHALMOLOGY

## 2025-05-12 ASSESSMENT — REFRACTION_CURRENTRX
OS_AXIS: 179
OD_VPRISM_DIRECTION: SV
OD_CYLINDER: +1.50
OS_VPRISM_DIRECTION: SV
OS_SPHERE: +2.75
OS_SPHERE: +0.75
OD_AXIS: 008
OD_SPHERE: +2.50
OS_AXIS: 180
OD_AXIS: 009
OD_SPHERE: -0.50
OD_SPHERE: +2.75
OS_OVR_VA: 20/
OS_SPHERE: +2.50
OD_OVR_VA: 20/
OS_CYLINDER: +1.50
OS_OVR_VA: 20/
OD_VPRISM_DIRECTION: SV
OS_OVR_VA: 20/
OS_VPRISM_DIRECTION: SV
OD_CYLINDER: +1.75
OS_VPRISM_DIRECTION: SV
OD_VPRISM_DIRECTION: SV
OS_CYLINDER: +1.00
OD_OVR_VA: 20/
OD_OVR_VA: 20/

## 2025-05-12 ASSESSMENT — REFRACTION_MANIFEST
OS_VA1: 20/25-2
OS_SPHERE: PLANO
OD_CYLINDER: +1.50
OS_AXIS: 005
OS_CYLINDER: +1.00
OD_CYLINDER: +1.50
OD_SPHERE: -0.25
OS_VA2: 20/20(J1+)
OD_ADD: +2.75
OS_AXIS: 005
OD_VA2: 20/20(J1+)
OS_CYLINDER: +1.00
OD_ADD: +3.00
OS_ADD: +2.75
OS_SPHERE: PLANO
OU_VA: 20/25-2
OD_SPHERE: -0.25
OU_VA: 20/25-2
OS_ADD: +3.00
OD_AXIS: 180
OS_VA2: 20/20(J1+)
OS_VA1: 20/25-2
OD_VA1: 20/100
OD_VA2: 20/20(J1+)
OD_VA1: 20/100
OD_AXIS: 180

## 2025-05-12 ASSESSMENT — SUPERFICIAL PUNCTATE KERATITIS (SPK)
OD_SPK: 2+
OS_SPK: 2+

## 2025-05-12 ASSESSMENT — CONFRONTATIONAL VISUAL FIELD TEST (CVF)
OD_FINDINGS: FULL
OS_FINDINGS: FULL

## 2025-05-12 ASSESSMENT — VISUAL ACUITY
OS_BCVA: 20/70-2
OD_BCVA: 20/70-

## 2025-05-12 ASSESSMENT — PACHYMETRY
OS_CT_CORRECTION: 1
OS_CT_UM: 526

## 2025-05-12 ASSESSMENT — LID POSITION - DERMATOCHALASIS
OS_DERMATOCHALASIS: LUL 2+
OD_DERMATOCHALASIS: RUL 2+

## 2025-05-12 ASSESSMENT — KERATOMETRY: METHOD_AUTO_MANUAL: AUTO

## 2025-06-24 ENCOUNTER — APPOINTMENT (OUTPATIENT)
Dept: CARDIOLOGY | Facility: CLINIC | Age: 85
End: 2025-06-24

## 2025-06-24 PROCEDURE — 93294 REM INTERROG EVL PM/LDLS PM: CPT

## 2025-06-24 PROCEDURE — 93296 REM INTERROG EVL PM/IDS: CPT

## 2025-08-07 ENCOUNTER — OFFICE (OUTPATIENT)
Dept: URBAN - METROPOLITAN AREA CLINIC 97 | Facility: CLINIC | Age: 85
Setting detail: OPHTHALMOLOGY
End: 2025-08-07
Payer: COMMERCIAL

## 2025-08-07 DIAGNOSIS — H40.1121: ICD-10-CM

## 2025-08-07 DIAGNOSIS — H40.1112: ICD-10-CM

## 2025-08-07 DIAGNOSIS — H52.4: ICD-10-CM

## 2025-08-07 DIAGNOSIS — H26.493: ICD-10-CM

## 2025-08-07 DIAGNOSIS — H35.373: ICD-10-CM

## 2025-08-07 PROCEDURE — 92014 COMPRE OPH EXAM EST PT 1/>: CPT | Performed by: OPHTHALMOLOGY

## 2025-08-07 PROCEDURE — 92134 CPTRZ OPH DX IMG PST SGM RTA: CPT | Performed by: OPHTHALMOLOGY

## 2025-08-07 PROCEDURE — 92015 DETERMINE REFRACTIVE STATE: CPT | Performed by: OPHTHALMOLOGY

## 2025-08-07 ASSESSMENT — REFRACTION_CURRENTRX
OS_VPRISM_DIRECTION: SV
OD_SPHERE: -0.50
OS_OVR_VA: 20/
OD_OVR_VA: 20/
OS_SPHERE: +2.75
OS_VPRISM_DIRECTION: SV
OS_OVR_VA: 20/
OS_CYLINDER: +1.00
OD_SPHERE: +2.50
OD_CYLINDER: +1.75
OD_VPRISM_DIRECTION: SV
OD_OVR_VA: 20/
OD_VPRISM_DIRECTION: SV
OS_CYLINDER: +1.50
OD_OVR_VA: 20/
OD_AXIS: 009
OS_SPHERE: +0.75
OD_CYLINDER: +1.50
OD_VPRISM_DIRECTION: SV
OD_AXIS: 008
OS_SPHERE: +2.50
OS_AXIS: 179
OS_AXIS: 180
OD_SPHERE: +2.75
OS_OVR_VA: 20/
OS_VPRISM_DIRECTION: SV

## 2025-08-07 ASSESSMENT — REFRACTION_MANIFEST
OS_AXIS: 005
OD_VA1: 20/100
OD_SPHERE: -0.25
OU_VA: 20/40-
OD_ADD: +3.00
OD_AXIS: 180
OS_ADD: +2.75
OS_VA2: 20/20(J1+)
OD_AXIS: 005
OD_VA2: 20/20(J1+)
OS_AXIS: 015
OD_SPHERE: +0.25
OS_SPHERE: +0.25
OS_VA2: 20/20(J1+)
OD_ADD: +2.75
OS_VA1: 20/25-2
OD_VA1: 20/60
OS_VA1: 20/60
OS_CYLINDER: +1.00
OU_VA: 20/25-2
OS_ADD: +3.00
OS_CYLINDER: +1.75
OD_CYLINDER: +1.50
OS_SPHERE: PLANO
OD_VA2: 20/20(J1+)
OD_CYLINDER: +1.25

## 2025-08-07 ASSESSMENT — KERATOMETRY
OS_AXISANGLE_DEGREES: 153
OD_K1POWER_DIOPTERS: 43.25
OD_K2POWER_DIOPTERS: 44.25
OS_K2POWER_DIOPTERS: 43.75
OS_K1POWER_DIOPTERS: 43.00
OD_AXISANGLE_DEGREES: 170
METHOD_AUTO_MANUAL: AUTO

## 2025-08-07 ASSESSMENT — PACHYMETRY
OS_CT_CORRECTION: 1
OS_CT_UM: 526

## 2025-08-07 ASSESSMENT — REFRACTION_AUTOREFRACTION
OD_AXIS: 004
OS_AXIS: 013
OD_SPHERE: +0.50
OS_CYLINDER: +2.00
OS_SPHERE: +0.50
OD_CYLINDER: +1.75

## 2025-08-07 ASSESSMENT — SUPERFICIAL PUNCTATE KERATITIS (SPK)
OS_SPK: 2+
OD_SPK: 2+

## 2025-08-07 ASSESSMENT — LID POSITION - DERMATOCHALASIS
OS_DERMATOCHALASIS: LUL 2+
OD_DERMATOCHALASIS: RUL 2+

## 2025-08-07 ASSESSMENT — TONOMETRY
OS_IOP_MMHG: 14
OD_IOP_MMHG: 14

## 2025-08-07 ASSESSMENT — CONFRONTATIONAL VISUAL FIELD TEST (CVF)
OD_FINDINGS: FULL
OS_FINDINGS: FULL

## 2025-08-07 ASSESSMENT — VISUAL ACUITY
OS_BCVA: 20/70-2
OD_BCVA: 20/70-2